# Patient Record
Sex: MALE | Race: WHITE | NOT HISPANIC OR LATINO | Employment: OTHER | RURAL
[De-identification: names, ages, dates, MRNs, and addresses within clinical notes are randomized per-mention and may not be internally consistent; named-entity substitution may affect disease eponyms.]

---

## 2020-03-26 ENCOUNTER — HISTORICAL (OUTPATIENT)
Dept: ADMINISTRATIVE | Facility: HOSPITAL | Age: 70
End: 2020-03-26

## 2020-03-26 LAB
ALBUMIN SERPL BCP-MCNC: 2.7 G/DL (ref 3.5–5)
ALBUMIN/GLOB SERPL: 0.5 {RATIO}
ALP SERPL-CCNC: 180 U/L (ref 45–115)
ALT SERPL W P-5'-P-CCNC: 101 U/L (ref 16–61)
AST SERPL W P-5'-P-CCNC: 81 U/L (ref 15–37)
BASOPHILS # BLD AUTO: 0.02 X10E3/UL (ref 0–0.2)
BASOPHILS NFR BLD AUTO: 0.3 % (ref 0–1)
BILIRUB SERPL-MCNC: 1 MG/DL (ref 0–1.2)
BUN SERPL-MCNC: 17 MG/DL (ref 7–18)
BUN/CREAT SERPL: 11.3
CALCIUM SERPL-MCNC: 9.1 MG/DL (ref 8.5–10.1)
CHLORIDE SERPL-SCNC: 99 MMOL/L (ref 98–107)
CO2 SERPL-SCNC: 28 MMOL/L (ref 21–32)
CREAT SERPL-MCNC: 1.51 MG/DL (ref 0.7–1.3)
EOSINOPHIL # BLD AUTO: 0.09 X10E3/UL (ref 0–0.5)
EOSINOPHIL NFR BLD AUTO: 1.3 % (ref 1–4)
ERYTHROCYTE [DISTWIDTH] IN BLOOD BY AUTOMATED COUNT: 12.9 % (ref 11.5–14.5)
GLOBULIN SER-MCNC: 5.1 G/DL (ref 2–4)
GLUCOSE SERPL-MCNC: 95 MG/DL (ref 74–106)
HCT VFR BLD AUTO: 45.9 % (ref 40–54)
HGB BLD-MCNC: 14.7 G/DL (ref 13.5–18)
IMM GRANULOCYTES # BLD AUTO: 0.12 X10E3/UL (ref 0–0.04)
IMM GRANULOCYTES NFR BLD: 1.7 % (ref 0–0.4)
LYMPHOCYTES # BLD AUTO: 0.8 X10E3/UL (ref 1–4.8)
LYMPHOCYTES NFR BLD AUTO: 11.5 % (ref 27–41)
MAGNESIUM SERPL-MCNC: 2.5 MG/DL (ref 1.7–2.3)
MCH RBC QN AUTO: 31.5 PG (ref 27–31)
MCHC RBC AUTO-ENTMCNC: 32 G/DL (ref 32–36)
MCV RBC AUTO: 98.3 FL (ref 80–96)
MONOCYTES # BLD AUTO: 0.45 X10E3/UL (ref 0–0.8)
MONOCYTES NFR BLD AUTO: 6.5 % (ref 2–6)
MPC BLD CALC-MCNC: 9.2 FL (ref 9.4–12.4)
NEUTROPHILS # BLD AUTO: 5.48 X10E3/UL (ref 1.8–7.7)
NEUTROPHILS NFR BLD AUTO: 78.7 % (ref 53–65)
NRBC # BLD AUTO: 0 X10E3/UL (ref 0–0)
NRBC, AUTO (.00): 0 /100 (ref 0–0)
PLATELET # BLD AUTO: 488 X10E3/UL (ref 150–400)
POTASSIUM SERPL-SCNC: 4.1 MMOL/L (ref 3.5–5.1)
PROT SERPL-MCNC: 7.8 G/DL (ref 6.4–8.2)
RBC # BLD AUTO: 4.67 X10E6/UL (ref 4.6–6.2)
SODIUM SERPL-SCNC: 135 MMOL/L (ref 136–145)
WBC # BLD AUTO: 6.96 X10E3/UL (ref 4.5–11)

## 2020-04-01 LAB
REPORT: NORMAL

## 2020-04-03 ENCOUNTER — HISTORICAL (OUTPATIENT)
Dept: ADMINISTRATIVE | Facility: HOSPITAL | Age: 70
End: 2020-04-03

## 2020-04-03 LAB
ALBUMIN SERPL BCP-MCNC: 2.5 G/DL (ref 3.5–5)
ALBUMIN/GLOB SERPL: 0.5 {RATIO}
ALP SERPL-CCNC: 157 U/L (ref 45–115)
ALT SERPL W P-5'-P-CCNC: 89 U/L (ref 16–61)
AST SERPL W P-5'-P-CCNC: 61 U/L (ref 15–37)
BASOPHILS # BLD AUTO: 0 X10E3/UL (ref 0–0.2)
BASOPHILS NFR BLD AUTO: 0 % (ref 0–1)
BILIRUB SERPL-MCNC: 0.6 MG/DL (ref 0–1.2)
BILIRUB UR QL STRIP: NEGATIVE MG/DL
BUN SERPL-MCNC: 11 MG/DL (ref 7–18)
BUN/CREAT SERPL: 7.8
CALCIUM SERPL-MCNC: 8.8 MG/DL (ref 8.5–10.1)
CHLORIDE SERPL-SCNC: 101 MMOL/L (ref 98–107)
CLARITY UR: CLEAR
CO2 SERPL-SCNC: 27 MMOL/L (ref 21–32)
COLOR UR: ABNORMAL
CREAT SERPL-MCNC: 1.41 MG/DL (ref 0.7–1.3)
EOSINOPHIL # BLD AUTO: 0.15 X10E3/UL (ref 0–0.5)
EOSINOPHIL NFR BLD AUTO: 2.1 % (ref 1–4)
ERYTHROCYTE [DISTWIDTH] IN BLOOD BY AUTOMATED COUNT: 13.1 % (ref 11.5–14.5)
GLOBULIN SER-MCNC: 5.1 G/DL (ref 2–4)
GLUCOSE SERPL-MCNC: 124 MG/DL (ref 74–106)
GLUCOSE UR STRIP-MCNC: NEGATIVE MG/DL
HCT VFR BLD AUTO: 41.5 % (ref 40–54)
HGB BLD-MCNC: 13 G/DL (ref 13.5–18)
IMM GRANULOCYTES # BLD AUTO: 0.13 X10E3/UL (ref 0–0.04)
IMM GRANULOCYTES NFR BLD: 1.8 % (ref 0–0.4)
KETONES UR STRIP-SCNC: NEGATIVE MG/DL
LACTATE SERPL-SCNC: 1.9 MMOL/L (ref 0.4–2)
LEUKOCYTE ESTERASE UR QL STRIP: NEGATIVE LEU/UL
LYMPHOCYTES # BLD AUTO: 1.03 X10E3/UL (ref 1–4.8)
LYMPHOCYTES NFR BLD AUTO: 14.2 % (ref 27–41)
MCH RBC QN AUTO: 31.2 PG (ref 27–31)
MCHC RBC AUTO-ENTMCNC: 31.3 G/DL (ref 32–36)
MCV RBC AUTO: 99.5 FL (ref 80–96)
MONOCYTES # BLD AUTO: 0.49 X10E3/UL (ref 0–0.8)
MONOCYTES NFR BLD AUTO: 6.7 % (ref 2–6)
MPC BLD CALC-MCNC: 9.2 FL (ref 9.4–12.4)
NEUTROPHILS # BLD AUTO: 5.47 X10E3/UL (ref 1.8–7.7)
NEUTROPHILS NFR BLD AUTO: 75.2 % (ref 53–65)
NITRITE UR QL STRIP: NEGATIVE
NRBC # BLD AUTO: 0 X10E3/UL (ref 0–0)
NRBC, AUTO (.00): 0 /100 (ref 0–0)
PH UR STRIP: 7 PH UNITS (ref 5–8)
PLATELET # BLD AUTO: 498 X10E3/UL (ref 150–400)
POTASSIUM SERPL-SCNC: 4.5 MMOL/L (ref 3.5–5.1)
PROT SERPL-MCNC: 7.6 G/DL (ref 6.4–8.2)
PROT UR QL STRIP: NEGATIVE MG/DL
RBC # BLD AUTO: 4.17 X10E6/UL (ref 4.6–6.2)
RBC # UR STRIP: NEGATIVE ERY/UL
SODIUM SERPL-SCNC: 137 MMOL/L (ref 136–145)
SP GR UR STRIP: <=1.005 (ref 1–1.03)
UROBILINOGEN UR STRIP-ACNC: 0.2 EU/DL
WBC # BLD AUTO: 7.27 X10E3/UL (ref 4.5–11)

## 2020-04-04 ENCOUNTER — HISTORICAL (OUTPATIENT)
Dept: ADMINISTRATIVE | Facility: HOSPITAL | Age: 70
End: 2020-04-04

## 2020-04-09 LAB
REPORT: NORMAL

## 2020-04-11 ENCOUNTER — HISTORICAL (OUTPATIENT)
Dept: ADMINISTRATIVE | Facility: HOSPITAL | Age: 70
End: 2020-04-11

## 2020-04-29 ENCOUNTER — HISTORICAL (OUTPATIENT)
Dept: ADMINISTRATIVE | Facility: HOSPITAL | Age: 70
End: 2020-04-29

## 2021-04-05 ENCOUNTER — HOSPITAL ENCOUNTER (OUTPATIENT)
Dept: RADIOLOGY | Facility: HOSPITAL | Age: 71
Discharge: HOME | End: 2021-04-05
Attending: INTERNAL MEDICINE
Payer: MEDICARE

## 2021-04-05 ENCOUNTER — OFFICE VISIT (OUTPATIENT)
Dept: CARDIOLOGY | Facility: CLINIC | Age: 71
End: 2021-04-05
Payer: MEDICARE

## 2021-04-05 VITALS
WEIGHT: 189 LBS | HEART RATE: 115 BPM | DIASTOLIC BLOOD PRESSURE: 62 MMHG | RESPIRATION RATE: 16 BRPM | BODY MASS INDEX: 26.46 KG/M2 | SYSTOLIC BLOOD PRESSURE: 100 MMHG | HEIGHT: 71 IN

## 2021-04-05 DIAGNOSIS — R06.02 SOB (SHORTNESS OF BREATH): ICD-10-CM

## 2021-04-05 DIAGNOSIS — G47.33 OBSTRUCTIVE SLEEP APNEA: ICD-10-CM

## 2021-04-05 DIAGNOSIS — E78.00 HYPERCHOLESTEREMIA: ICD-10-CM

## 2021-04-05 DIAGNOSIS — I73.9 CLAUDICATION: ICD-10-CM

## 2021-04-05 DIAGNOSIS — R07.9 CHEST PAIN, UNSPECIFIED TYPE: ICD-10-CM

## 2021-04-05 DIAGNOSIS — R07.9 CHEST PAIN, UNSPECIFIED TYPE: Primary | ICD-10-CM

## 2021-04-05 DIAGNOSIS — R06.02 SOB (SHORTNESS OF BREATH): Primary | ICD-10-CM

## 2021-04-05 DIAGNOSIS — R00.0 SINUS TACHYCARDIA: ICD-10-CM

## 2021-04-05 DIAGNOSIS — I10 ESSENTIAL HYPERTENSION, BENIGN: ICD-10-CM

## 2021-04-05 PROCEDURE — 99214 OFFICE O/P EST MOD 30 MIN: CPT | Mod: S$PBB,,, | Performed by: INTERNAL MEDICINE

## 2021-04-05 PROCEDURE — 71046 X-RAY EXAM CHEST 2 VIEWS: CPT | Mod: TC

## 2021-04-05 PROCEDURE — 93010 EKG 12-LEAD: ICD-10-PCS | Mod: S$PBB,,, | Performed by: INTERNAL MEDICINE

## 2021-04-05 PROCEDURE — 71046 XR CHEST PA AND LATERAL: ICD-10-PCS | Mod: 26,,, | Performed by: RADIOLOGY

## 2021-04-05 PROCEDURE — 71046 X-RAY EXAM CHEST 2 VIEWS: CPT | Mod: 26,,, | Performed by: RADIOLOGY

## 2021-04-05 PROCEDURE — 93005 ELECTROCARDIOGRAM TRACING: CPT | Mod: PBBFAC | Performed by: INTERNAL MEDICINE

## 2021-04-05 PROCEDURE — 99214 OFFICE O/P EST MOD 30 MIN: CPT | Mod: PBBFAC,25 | Performed by: INTERNAL MEDICINE

## 2021-04-05 PROCEDURE — 99214 PR OFFICE/OUTPT VISIT, EST, LEVL IV, 30-39 MIN: ICD-10-PCS | Mod: S$PBB,,, | Performed by: INTERNAL MEDICINE

## 2021-04-05 PROCEDURE — 99999 PR PBB SHADOW E&M-EST. PATIENT-LVL IV: ICD-10-PCS | Mod: PBBFAC,,, | Performed by: INTERNAL MEDICINE

## 2021-04-05 PROCEDURE — 93010 ELECTROCARDIOGRAM REPORT: CPT | Mod: S$PBB,,, | Performed by: INTERNAL MEDICINE

## 2021-04-05 PROCEDURE — 99999 PR PBB SHADOW E&M-EST. PATIENT-LVL IV: CPT | Mod: PBBFAC,,, | Performed by: INTERNAL MEDICINE

## 2021-04-05 RX ORDER — OMEPRAZOLE 20 MG/1
20 CAPSULE, DELAYED RELEASE ORAL 2 TIMES DAILY
COMMUNITY

## 2021-04-05 RX ORDER — AZELASTINE 1 MG/ML
1 SPRAY, METERED NASAL DAILY
COMMUNITY

## 2021-04-05 RX ORDER — TAMSULOSIN HYDROCHLORIDE 0.4 MG/1
0.4 CAPSULE ORAL 2 TIMES DAILY
COMMUNITY

## 2021-04-05 RX ORDER — FLUTICASONE PROPIONATE 50 MCG
1 SPRAY, SUSPENSION (ML) NASAL DAILY
COMMUNITY

## 2021-04-05 RX ORDER — PRAMIPEXOLE DIHYDROCHLORIDE 0.5 MG/1
0.5 TABLET ORAL NIGHTLY PRN
COMMUNITY
End: 2023-01-19

## 2021-04-05 RX ORDER — TRIAMCINOLONE ACETONIDE 0.25 MG/G
0.03 CREAM TOPICAL DAILY
COMMUNITY

## 2021-04-05 RX ORDER — LISINOPRIL AND HYDROCHLOROTHIAZIDE 10; 12.5 MG/1; MG/1
1 TABLET ORAL DAILY
COMMUNITY

## 2021-04-05 RX ORDER — LANOLIN ALCOHOL/MO/W.PET/CERES
1000 CREAM (GRAM) TOPICAL DAILY
COMMUNITY

## 2021-04-05 RX ORDER — IBUPROFEN 100 MG/5ML
1000 SUSPENSION, ORAL (FINAL DOSE FORM) ORAL DAILY
COMMUNITY

## 2021-04-05 RX ORDER — ROSUVASTATIN CALCIUM 20 MG/1
10 TABLET, COATED ORAL DAILY
COMMUNITY

## 2021-04-05 RX ORDER — MINERAL OIL
180 ENEMA (ML) RECTAL DAILY
COMMUNITY

## 2021-04-05 RX ORDER — GUAIFENESIN 1200 MG/1
1200 TABLET, EXTENDED RELEASE ORAL EVERY 4 HOURS PRN
COMMUNITY
End: 2023-01-19

## 2021-04-05 RX ORDER — ERGOCALCIFEROL 1.25 MG/1
50000 CAPSULE ORAL
COMMUNITY
End: 2023-01-19

## 2021-04-07 PROBLEM — I73.9 CLAUDICATION: Status: ACTIVE | Noted: 2021-04-07

## 2021-04-07 PROBLEM — R00.0 SINUS TACHYCARDIA: Status: ACTIVE | Noted: 2021-04-07

## 2021-04-07 PROBLEM — R06.02 SOB (SHORTNESS OF BREATH): Status: ACTIVE | Noted: 2021-04-07

## 2021-04-07 PROBLEM — R07.9 CHEST PAIN: Status: ACTIVE | Noted: 2021-04-07

## 2021-04-07 PROBLEM — E78.00 HYPERCHOLESTEREMIA: Status: ACTIVE | Noted: 2021-04-07

## 2021-04-07 PROBLEM — G47.33 OBSTRUCTIVE SLEEP APNEA: Status: ACTIVE | Noted: 2021-04-07

## 2021-04-15 ENCOUNTER — HOSPITAL ENCOUNTER (OUTPATIENT)
Dept: CARDIOLOGY | Facility: HOSPITAL | Age: 71
Discharge: HOME | End: 2021-04-15
Attending: INTERNAL MEDICINE
Payer: MEDICARE

## 2021-04-15 ENCOUNTER — CLINICAL SUPPORT (OUTPATIENT)
Dept: PULMONOLOGY | Facility: HOSPITAL | Age: 71
End: 2021-04-15
Attending: INTERNAL MEDICINE
Payer: MEDICARE

## 2021-04-15 VITALS — OXYGEN SATURATION: 97 %

## 2021-04-15 VITALS
HEIGHT: 71 IN | WEIGHT: 175 LBS | DIASTOLIC BLOOD PRESSURE: 70 MMHG | SYSTOLIC BLOOD PRESSURE: 102 MMHG | HEART RATE: 101 BPM | BODY MASS INDEX: 24.5 KG/M2

## 2021-04-15 DIAGNOSIS — R00.0 SINUS TACHYCARDIA: ICD-10-CM

## 2021-04-15 DIAGNOSIS — R06.02 SOB (SHORTNESS OF BREATH): ICD-10-CM

## 2021-04-15 DIAGNOSIS — R07.9 CHEST PAIN, UNSPECIFIED TYPE: ICD-10-CM

## 2021-04-15 PROCEDURE — 94060 EVALUATION OF WHEEZING: CPT

## 2021-04-15 PROCEDURE — 94060 PR EVAL OF BRONCHOSPASM: ICD-10-PCS | Mod: 26,,, | Performed by: INTERNAL MEDICINE

## 2021-04-15 PROCEDURE — 93306 ECHO (CUPID ONLY): ICD-10-PCS | Mod: 26,,, | Performed by: INTERNAL MEDICINE

## 2021-04-15 PROCEDURE — 93018 CV STRESS TEST I&R ONLY: CPT | Mod: ,,, | Performed by: INTERNAL MEDICINE

## 2021-04-15 PROCEDURE — 94727 GAS DIL/WSHOT DETER LNG VOL: CPT | Mod: 26,,, | Performed by: INTERNAL MEDICINE

## 2021-04-15 PROCEDURE — 93226 XTRNL ECG REC<48 HR SCAN A/R: CPT

## 2021-04-15 PROCEDURE — 94726 PLETHYSMOGRAPHY LUNG VOLUMES: CPT

## 2021-04-15 PROCEDURE — 93018 EXERCISE STRESS - EKG (CUPID ONLY): ICD-10-PCS | Mod: ,,, | Performed by: INTERNAL MEDICINE

## 2021-04-15 PROCEDURE — 94060 EVALUATION OF WHEEZING: CPT | Mod: 26,,, | Performed by: INTERNAL MEDICINE

## 2021-04-15 PROCEDURE — 94729 DIFFUSING CAPACITY: CPT

## 2021-04-15 PROCEDURE — 93306 TTE W/DOPPLER COMPLETE: CPT | Mod: 26,,, | Performed by: INTERNAL MEDICINE

## 2021-04-15 PROCEDURE — 94729 DIFFUSING CAPACITY: CPT | Mod: 26,,, | Performed by: INTERNAL MEDICINE

## 2021-04-15 PROCEDURE — 93016 CV STRESS TEST SUPVJ ONLY: CPT | Mod: ,,, | Performed by: NURSE PRACTITIONER

## 2021-04-15 PROCEDURE — 93017 CV STRESS TEST TRACING ONLY: CPT

## 2021-04-15 PROCEDURE — 93306 TTE W/DOPPLER COMPLETE: CPT

## 2021-04-15 PROCEDURE — 94727 PR PULM FUNCTION TEST BY GAS: ICD-10-PCS | Mod: 26,,, | Performed by: INTERNAL MEDICINE

## 2021-04-15 PROCEDURE — 93016 EXERCISE STRESS - EKG (CUPID ONLY): ICD-10-PCS | Mod: ,,, | Performed by: NURSE PRACTITIONER

## 2021-04-15 PROCEDURE — 94729 PR C02/MEMBANE DIFFUSE CAPACITY: ICD-10-PCS | Mod: 26,,, | Performed by: INTERNAL MEDICINE

## 2021-04-19 LAB
AORTIC VALVE CUSP SEPERATION: 1.98 CM
AV MEAN GRADIENT: 3 MMHG
BSA FOR ECHO PROCEDURE: 1.99 M2
CV ECHO LV RWT: 0.38 CM
DOP CALC AO VTI: 16.26 CM
DOP CALC LVOT AREA: 4.2 CM2
DOP CALC LVOT DIAMETER: 2.3 CM
E WAVE DECELERATION TIME: 188 MSEC
ECHO LV POSTERIOR WALL: 0.72 CM (ref 0.6–1.1)
EJECTION FRACTION: 60 %
FRACTIONAL SHORTENING: 45 % (ref 28–44)
INTERVENTRICULAR SEPTUM: 1.15 CM (ref 0.6–1.1)
LEFT ATRIUM SIZE: 3.4 CM
LEFT INTERNAL DIMENSION IN SYSTOLE: 2.09 CM (ref 2.1–4)
LEFT VENTRICLE DIASTOLIC VOLUME INDEX: 33.57 ML/M2
LEFT VENTRICLE DIASTOLIC VOLUME: 66.8 ML
LEFT VENTRICLE MASS INDEX: 53 G/M2
LEFT VENTRICLE SYSTOLIC VOLUME INDEX: 9.4 ML/M2
LEFT VENTRICLE SYSTOLIC VOLUME: 18.7 ML
LEFT VENTRICULAR INTERNAL DIMENSION IN DIASTOLE: 3.77 CM (ref 3.5–6)
LEFT VENTRICULAR MASS: 105.29 G
MV PEAK E VEL: 0.63 M/S
PISA TR MAX VEL: 2.47 M/S
RIGHT VENTRICULAR END-DIASTOLIC DIMENSION: 2 CM
TR MAX PG: 24 MMHG
TRICUSPID ANNULAR PLANE SYSTOLIC EXCURSION: 3 CM

## 2021-04-21 ENCOUNTER — OFFICE VISIT (OUTPATIENT)
Dept: CARDIOLOGY | Facility: CLINIC | Age: 71
End: 2021-04-21
Payer: MEDICARE

## 2021-04-21 VITALS
DIASTOLIC BLOOD PRESSURE: 60 MMHG | WEIGHT: 184.5 LBS | RESPIRATION RATE: 16 BRPM | HEIGHT: 71 IN | HEART RATE: 88 BPM | SYSTOLIC BLOOD PRESSURE: 98 MMHG | BODY MASS INDEX: 25.83 KG/M2

## 2021-04-21 DIAGNOSIS — E78.00 HYPERCHOLESTEREMIA: ICD-10-CM

## 2021-04-21 DIAGNOSIS — I10 ESSENTIAL HYPERTENSION, BENIGN: Primary | ICD-10-CM

## 2021-04-21 DIAGNOSIS — R06.09 DOE (DYSPNEA ON EXERTION): ICD-10-CM

## 2021-04-21 DIAGNOSIS — R06.02 SOB (SHORTNESS OF BREATH): ICD-10-CM

## 2021-04-21 LAB
CV STRESS BASE HR: 101 BPM
DIASTOLIC BLOOD PRESSURE: 70 MMHG
OHS CV CPX 1 MINUTE RECOVERY HEART RATE: 115 BPM
OHS CV CPX 85 PERCENT MAX PREDICTED HEART RATE MALE: 126
OHS CV CPX ESTIMATED METS: 6
OHS CV CPX MAX PREDICTED HEART RATE: 148
OHS CV CPX PATIENT IS FEMALE: 0
OHS CV CPX PATIENT IS MALE: 1
OHS CV CPX PEAK DIASTOLIC BLOOD PRESSURE: 63 MMHG
OHS CV CPX PEAK HEAR RATE: 142 BPM
OHS CV CPX PEAK RATE PRESSURE PRODUCT: NORMAL
OHS CV CPX PEAK SYSTOLIC BLOOD PRESSURE: 146 MMHG
OHS CV CPX PERCENT MAX PREDICTED HEART RATE ACHIEVED: 96
OHS CV CPX RATE PRESSURE PRODUCT PRESENTING: NORMAL
STRESS ECHO POST EXERCISE DUR MIN: 4 MINUTES
STRESS ECHO POST EXERCISE DUR SEC: 15 SECONDS
SYSTOLIC BLOOD PRESSURE: 102 MMHG

## 2021-04-21 PROCEDURE — 99214 OFFICE O/P EST MOD 30 MIN: CPT | Mod: S$PBB,,, | Performed by: INTERNAL MEDICINE

## 2021-04-21 PROCEDURE — 99214 PR OFFICE/OUTPT VISIT, EST, LEVL IV, 30-39 MIN: ICD-10-PCS | Mod: S$PBB,,, | Performed by: INTERNAL MEDICINE

## 2021-04-21 PROCEDURE — 99999 PR PBB SHADOW E&M-EST. PATIENT-LVL IV: ICD-10-PCS | Mod: PBBFAC,,, | Performed by: INTERNAL MEDICINE

## 2021-04-21 PROCEDURE — 99999 PR PBB SHADOW E&M-EST. PATIENT-LVL IV: CPT | Mod: PBBFAC,,, | Performed by: INTERNAL MEDICINE

## 2021-04-21 PROCEDURE — 99214 OFFICE O/P EST MOD 30 MIN: CPT | Mod: PBBFAC | Performed by: INTERNAL MEDICINE

## 2021-04-26 DIAGNOSIS — Z01.812 PRE-OPERATIVE LABORATORY EXAMINATION: Primary | ICD-10-CM

## 2021-04-26 DIAGNOSIS — R06.09 DOE (DYSPNEA ON EXERTION): Primary | ICD-10-CM

## 2021-04-26 RX ORDER — DIPHENHYDRAMINE HCL 25 MG
50 CAPSULE ORAL
Status: CANCELLED | OUTPATIENT
Start: 2021-05-03

## 2021-04-26 RX ORDER — SODIUM CHLORIDE 450 MG/100ML
INJECTION, SOLUTION INTRAVENOUS CONTINUOUS
Status: CANCELLED | OUTPATIENT
Start: 2021-05-03

## 2021-04-26 RX ORDER — DIAZEPAM 2 MG/1
5 TABLET ORAL ONCE
Status: CANCELLED | OUTPATIENT
Start: 2021-05-03

## 2021-05-03 ENCOUNTER — HOSPITAL ENCOUNTER (OUTPATIENT)
Facility: HOSPITAL | Age: 71
Discharge: HOME | End: 2021-05-04
Attending: INTERNAL MEDICINE | Admitting: INTERNAL MEDICINE
Payer: MEDICARE

## 2021-05-03 DIAGNOSIS — R06.09 DOE (DYSPNEA ON EXERTION): ICD-10-CM

## 2021-05-03 DIAGNOSIS — I25.10 ATHEROSCLEROSIS OF CORONARY ARTERY: ICD-10-CM

## 2021-05-03 LAB
CATH EF QUANTITATIVE: 60 %
CHOLEST SERPL-MCNC: 127 MG/DL (ref 0–200)
CHOLEST/HDLC SERPL: 4 {RATIO}
GLUCOSE SERPL-MCNC: 131 MG/DL (ref 70–105)
HDLC SERPL-MCNC: 32 MG/DL (ref 40–60)
LDLC SERPL CALC-MCNC: 71 MG/DL
LDLC/HDLC SERPL: 2.2 {RATIO}
NONHDLC SERPL-MCNC: 95 MG/DL
TRIGL SERPL-MCNC: 122 MG/DL (ref 35–150)
VLDLC SERPL-MCNC: 24 MG/DL

## 2021-05-03 PROCEDURE — 92928 PR STENT: ICD-10-PCS | Mod: LD,,, | Performed by: INTERNAL MEDICINE

## 2021-05-03 PROCEDURE — 93458 L HRT ARTERY/VENTRICLE ANGIO: CPT | Mod: 59 | Performed by: INTERNAL MEDICINE

## 2021-05-03 PROCEDURE — C1769 GUIDE WIRE: HCPCS | Performed by: INTERNAL MEDICINE

## 2021-05-03 PROCEDURE — 99153 MOD SED SAME PHYS/QHP EA: CPT | Performed by: INTERNAL MEDICINE

## 2021-05-03 PROCEDURE — 36415 COLL VENOUS BLD VENIPUNCTURE: CPT | Performed by: INTERNAL MEDICINE

## 2021-05-03 PROCEDURE — 63600175 PHARM REV CODE 636 W HCPCS: Performed by: INTERNAL MEDICINE

## 2021-05-03 PROCEDURE — 25000003 PHARM REV CODE 250: Performed by: INTERNAL MEDICINE

## 2021-05-03 PROCEDURE — 93571 IV DOP VEL&/PRESS C FLO 1ST: CPT | Mod: 26,52,, | Performed by: INTERNAL MEDICINE

## 2021-05-03 PROCEDURE — 93458 L HRT ARTERY/VENTRICLE ANGIO: CPT | Mod: 26,XU,, | Performed by: INTERNAL MEDICINE

## 2021-05-03 PROCEDURE — 80061 LIPID PANEL: CPT | Performed by: INTERNAL MEDICINE

## 2021-05-03 PROCEDURE — C1894 INTRO/SHEATH, NON-LASER: HCPCS | Performed by: INTERNAL MEDICINE

## 2021-05-03 PROCEDURE — 94761 N-INVAS EAR/PLS OXIMETRY MLT: CPT | Mod: 59

## 2021-05-03 PROCEDURE — 27800903 OPTIME MED/SURG SUP & DEVICES OTHER IMPLANTS: Performed by: INTERNAL MEDICINE

## 2021-05-03 PROCEDURE — 27100168 OPTIME MED/SURG SUP & DEVICES NON-STERILE SUPPLY: Performed by: INTERNAL MEDICINE

## 2021-05-03 PROCEDURE — 93571 IV DOP VEL&/PRESS C FLO 1ST: CPT | Mod: 52 | Performed by: INTERNAL MEDICINE

## 2021-05-03 PROCEDURE — C9600 PERC DRUG-EL COR STENT SING: HCPCS | Mod: LD | Performed by: INTERNAL MEDICINE

## 2021-05-03 PROCEDURE — C1887 CATHETER, GUIDING: HCPCS | Performed by: INTERNAL MEDICINE

## 2021-05-03 PROCEDURE — 27201423 OPTIME MED/SURG SUP & DEVICES STERILE SUPPLY: Performed by: INTERNAL MEDICINE

## 2021-05-03 PROCEDURE — 92928 PRQ TCAT PLMT NTRAC ST 1 LES: CPT | Mod: LD,,, | Performed by: INTERNAL MEDICINE

## 2021-05-03 PROCEDURE — C1725 CATH, TRANSLUMIN NON-LASER: HCPCS | Performed by: INTERNAL MEDICINE

## 2021-05-03 PROCEDURE — 99152 MOD SED SAME PHYS/QHP 5/>YRS: CPT | Performed by: INTERNAL MEDICINE

## 2021-05-03 PROCEDURE — C1760 CLOSURE DEV, VASC: HCPCS | Performed by: INTERNAL MEDICINE

## 2021-05-03 PROCEDURE — 93458 PR CATH PLACE/CORON ANGIO, IMG SUPER/INTERP,W LEFT HEART VENTRICULOGRAPHY: ICD-10-PCS | Mod: 26,XU,, | Performed by: INTERNAL MEDICINE

## 2021-05-03 PROCEDURE — 27000654 HC CATH IV JELCO

## 2021-05-03 PROCEDURE — 25000003 PHARM REV CODE 250

## 2021-05-03 PROCEDURE — 25500020 PHARM REV CODE 255: Performed by: INTERNAL MEDICINE

## 2021-05-03 PROCEDURE — 82962 GLUCOSE BLOOD TEST: CPT

## 2021-05-03 PROCEDURE — 93571 PR HEART FLOW RESERV MEASURE,INIT VESSL: ICD-10-PCS | Mod: 26,52,, | Performed by: INTERNAL MEDICINE

## 2021-05-03 PROCEDURE — C1874 STENT, COATED/COV W/DEL SYS: HCPCS | Performed by: INTERNAL MEDICINE

## 2021-05-03 PROCEDURE — 27000080 OPTIME MED/SURG SUP & DEVICES GENERAL CLASSIFICATION: Performed by: INTERNAL MEDICINE

## 2021-05-03 DEVICE — XIENCE SIERRA™ EVEROLIMUS ELUTING CORONARY STENT SYSTEM 3.50 MM X 12 MM / RAPID-EXCHANGE
Type: IMPLANTABLE DEVICE | Site: CORONARY | Status: FUNCTIONAL
Brand: XIENCE SIERRA™

## 2021-05-03 DEVICE — XIENCE SIERRA™ EVEROLIMUS ELUTING CORONARY STENT SYSTEM 3.25 MM X 23 MM / RAPID-EXCHANGE
Type: IMPLANTABLE DEVICE | Site: CORONARY | Status: FUNCTIONAL
Brand: XIENCE SIERRA™

## 2021-05-03 DEVICE — XIENCE SIERRA™ EVEROLIMUS ELUTING CORONARY STENT SYSTEM 3.00 MM X 38 MM / RAPID-EXCHANGE
Type: IMPLANTABLE DEVICE | Site: CORONARY | Status: FUNCTIONAL
Brand: XIENCE SIERRA™

## 2021-05-03 RX ORDER — DIAZEPAM 5 MG/1
TABLET ORAL
Status: DISCONTINUED | OUTPATIENT
Start: 2021-05-03 | End: 2021-05-03 | Stop reason: HOSPADM

## 2021-05-03 RX ORDER — DIAZEPAM 5 MG/1
5 TABLET ORAL ONCE
Status: COMPLETED | OUTPATIENT
Start: 2021-05-03 | End: 2021-05-03

## 2021-05-03 RX ORDER — FENTANYL CITRATE 50 UG/ML
INJECTION, SOLUTION INTRAMUSCULAR; INTRAVENOUS
Status: DISCONTINUED | OUTPATIENT
Start: 2021-05-03 | End: 2021-05-03 | Stop reason: HOSPADM

## 2021-05-03 RX ORDER — ACETAMINOPHEN 325 MG/1
650 TABLET ORAL EVERY 4 HOURS PRN
Status: DISCONTINUED | OUTPATIENT
Start: 2021-05-03 | End: 2021-05-04 | Stop reason: HOSPADM

## 2021-05-03 RX ORDER — ASPIRIN 81 MG/1
81 TABLET ORAL DAILY
Status: DISCONTINUED | OUTPATIENT
Start: 2021-05-03 | End: 2021-05-04 | Stop reason: HOSPADM

## 2021-05-03 RX ORDER — LIDOCAINE HYDROCHLORIDE 10 MG/ML
INJECTION, SOLUTION EPIDURAL; INFILTRATION; INTRACAUDAL; PERINEURAL
Status: DISCONTINUED | OUTPATIENT
Start: 2021-05-03 | End: 2021-05-03 | Stop reason: HOSPADM

## 2021-05-03 RX ORDER — ONDANSETRON 4 MG/1
8 TABLET, ORALLY DISINTEGRATING ORAL EVERY 8 HOURS PRN
Status: DISCONTINUED | OUTPATIENT
Start: 2021-05-03 | End: 2021-05-04 | Stop reason: HOSPADM

## 2021-05-03 RX ORDER — HEPARIN SOD,PORCINE/0.9 % NACL 1000/500ML
INTRAVENOUS SOLUTION INTRAVENOUS
Status: DISCONTINUED | OUTPATIENT
Start: 2021-05-03 | End: 2021-05-03 | Stop reason: HOSPADM

## 2021-05-03 RX ORDER — SODIUM CHLORIDE 450 MG/100ML
INJECTION, SOLUTION INTRAVENOUS
Status: DISCONTINUED | OUTPATIENT
Start: 2021-05-03 | End: 2021-05-04 | Stop reason: HOSPADM

## 2021-05-03 RX ORDER — DIPHENHYDRAMINE HCL 25 MG
50 CAPSULE ORAL
Status: DISCONTINUED | OUTPATIENT
Start: 2021-05-03 | End: 2021-05-03 | Stop reason: HOSPADM

## 2021-05-03 RX ORDER — SODIUM CHLORIDE 450 MG/100ML
INJECTION, SOLUTION INTRAVENOUS
Status: COMPLETED
Start: 2021-05-03 | End: 2021-05-03

## 2021-05-03 RX ORDER — NITROGLYCERIN 5 MG/ML
INJECTION, SOLUTION INTRAVENOUS
Status: DISCONTINUED | OUTPATIENT
Start: 2021-05-03 | End: 2021-05-03 | Stop reason: HOSPADM

## 2021-05-03 RX ORDER — HEPARIN SODIUM 1000 [USP'U]/ML
INJECTION, SOLUTION INTRAVENOUS; SUBCUTANEOUS
Status: DISCONTINUED | OUTPATIENT
Start: 2021-05-03 | End: 2021-05-03 | Stop reason: HOSPADM

## 2021-05-03 RX ORDER — MIDAZOLAM HYDROCHLORIDE 1 MG/ML
INJECTION INTRAMUSCULAR; INTRAVENOUS
Status: DISCONTINUED | OUTPATIENT
Start: 2021-05-03 | End: 2021-05-03 | Stop reason: HOSPADM

## 2021-05-03 RX ORDER — SODIUM CHLORIDE 450 MG/100ML
INJECTION, SOLUTION INTRAVENOUS CONTINUOUS
Status: DISCONTINUED | OUTPATIENT
Start: 2021-05-03 | End: 2021-05-04 | Stop reason: HOSPADM

## 2021-05-03 RX ADMIN — BIVALIRUDIN 1.75 MG/KG/HR: 250 INJECTION, POWDER, LYOPHILIZED, FOR SOLUTION INTRAVENOUS at 12:05

## 2021-05-03 RX ADMIN — ASPIRIN 81 MG: 81 TABLET, COATED ORAL at 02:05

## 2021-05-03 RX ADMIN — SODIUM CHLORIDE: 4.5 INJECTION, SOLUTION INTRAVENOUS at 08:05

## 2021-05-03 RX ADMIN — DIPHENHYDRAMINE HYDROCHLORIDE 50 MG: 25 CAPSULE ORAL at 11:05

## 2021-05-03 RX ADMIN — TICAGRELOR 90 MG: 90 TABLET ORAL at 09:05

## 2021-05-03 RX ADMIN — DIAZEPAM 5 MG: 5 TABLET ORAL at 11:05

## 2021-05-04 ENCOUNTER — HOSPITAL ENCOUNTER (OUTPATIENT)
Dept: RADIOLOGY | Facility: HOSPITAL | Age: 71
Discharge: HOME | End: 2021-05-04
Attending: INTERNAL MEDICINE
Payer: MEDICARE

## 2021-05-04 VITALS
DIASTOLIC BLOOD PRESSURE: 74 MMHG | TEMPERATURE: 98 F | RESPIRATION RATE: 18 BRPM | HEART RATE: 61 BPM | WEIGHT: 180 LBS | BODY MASS INDEX: 25.2 KG/M2 | OXYGEN SATURATION: 98 % | SYSTOLIC BLOOD PRESSURE: 128 MMHG | HEIGHT: 71 IN

## 2021-05-04 DIAGNOSIS — R19.09 GROIN SWELLING: Primary | ICD-10-CM

## 2021-05-04 DIAGNOSIS — R19.09 GROIN SWELLING: ICD-10-CM

## 2021-05-04 DIAGNOSIS — M79.81 NONTRAUMATIC HEMATOMA OF SOFT TISSUE: ICD-10-CM

## 2021-05-04 LAB
BASOPHILS # BLD AUTO: 0.01 K/UL (ref 0–0.2)
BASOPHILS NFR BLD AUTO: 0.2 % (ref 0–1)
CK MB SERPL-MCNC: <1 NG/ML (ref 1–3.6)
DIFFERENTIAL METHOD BLD: ABNORMAL
EOSINOPHIL # BLD AUTO: 0.11 K/UL (ref 0–0.5)
EOSINOPHIL NFR BLD AUTO: 2.5 % (ref 1–4)
ERYTHROCYTE [DISTWIDTH] IN BLOOD BY AUTOMATED COUNT: 13.7 % (ref 11.5–14.5)
GLUCOSE SERPL-MCNC: 103 MG/DL (ref 70–105)
HCT VFR BLD AUTO: 40 % (ref 40–54)
HGB BLD-MCNC: 13.1 G/DL (ref 13.5–18)
IMM GRANULOCYTES # BLD AUTO: 0.05 K/UL (ref 0–0.04)
IMM GRANULOCYTES NFR BLD: 1.1 % (ref 0–0.4)
LYMPHOCYTES # BLD AUTO: 0.77 K/UL (ref 1–4.8)
LYMPHOCYTES NFR BLD AUTO: 17.7 % (ref 27–41)
MCH RBC QN AUTO: 32.1 PG (ref 27–31)
MCHC RBC AUTO-ENTMCNC: 32.8 G/DL (ref 32–36)
MCV RBC AUTO: 98 FL (ref 80–96)
MONOCYTES # BLD AUTO: 0.31 K/UL (ref 0–0.8)
MONOCYTES NFR BLD AUTO: 7.1 % (ref 2–6)
MPC BLD CALC-MCNC: 10.8 FL (ref 9.4–12.4)
NEUTROPHILS # BLD AUTO: 3.11 K/UL (ref 1.8–7.7)
NEUTROPHILS NFR BLD AUTO: 71.4 % (ref 53–65)
NRBC # BLD AUTO: 0 X10E3/UL
NRBC, AUTO (.00): 0 %
PLATELET # BLD AUTO: 178 K/UL (ref 150–400)
RBC # BLD AUTO: 4.08 M/UL (ref 4.6–6.2)
WBC # BLD AUTO: 4.36 K/UL (ref 4.5–11)

## 2021-05-04 PROCEDURE — 82553 CREATINE MB FRACTION: CPT | Performed by: INTERNAL MEDICINE

## 2021-05-04 PROCEDURE — 93010 EKG 12-LEAD: ICD-10-PCS | Mod: ,,, | Performed by: INTERNAL MEDICINE

## 2021-05-04 PROCEDURE — 93010 ELECTROCARDIOGRAM REPORT: CPT | Mod: ,,, | Performed by: INTERNAL MEDICINE

## 2021-05-04 PROCEDURE — 85025 COMPLETE CBC W/AUTO DIFF WBC: CPT | Performed by: INTERNAL MEDICINE

## 2021-05-04 PROCEDURE — 25000003 PHARM REV CODE 250: Performed by: INTERNAL MEDICINE

## 2021-05-04 PROCEDURE — 36415 COLL VENOUS BLD VENIPUNCTURE: CPT | Performed by: INTERNAL MEDICINE

## 2021-05-04 PROCEDURE — 93926 US PSEUDOANEURYSM EVALUATION RIGHT: ICD-10-PCS | Mod: 26,RT,, | Performed by: RADIOLOGY

## 2021-05-04 PROCEDURE — 93926 LOWER EXTREMITY STUDY: CPT | Mod: TC,RT

## 2021-05-04 PROCEDURE — 99212 OFFICE O/P EST SF 10 MIN: CPT | Mod: ,,, | Performed by: STUDENT IN AN ORGANIZED HEALTH CARE EDUCATION/TRAINING PROGRAM

## 2021-05-04 PROCEDURE — 82962 GLUCOSE BLOOD TEST: CPT

## 2021-05-04 PROCEDURE — 93005 ELECTROCARDIOGRAM TRACING: CPT

## 2021-05-04 PROCEDURE — 93926 LOWER EXTREMITY STUDY: CPT | Mod: 26,RT,, | Performed by: RADIOLOGY

## 2021-05-04 PROCEDURE — 99212 PR OFFICE/OUTPT VISIT, EST, LEVL II, 10-19 MIN: ICD-10-PCS | Mod: ,,, | Performed by: STUDENT IN AN ORGANIZED HEALTH CARE EDUCATION/TRAINING PROGRAM

## 2021-05-04 RX ORDER — ASPIRIN 81 MG/1
81 TABLET ORAL DAILY
Qty: 90 TABLET | Refills: 3 | Status: SHIPPED | OUTPATIENT
Start: 2021-05-05 | End: 2023-07-19

## 2021-05-04 RX ADMIN — SODIUM CHLORIDE: 4.5 INJECTION, SOLUTION INTRAVENOUS at 01:05

## 2021-05-04 RX ADMIN — TICAGRELOR 90 MG: 90 TABLET ORAL at 08:05

## 2021-05-04 RX ADMIN — ASPIRIN 81 MG: 81 TABLET, COATED ORAL at 08:05

## 2021-05-13 ENCOUNTER — NURSE TRIAGE (OUTPATIENT)
Dept: ADMINISTRATIVE | Facility: CLINIC | Age: 71
End: 2021-05-13

## 2021-05-17 LAB
OHS CV EVENT MONITOR DAY: 0
OHS CV HOLTER LENGTH DECIMAL HOURS: 24
OHS CV HOLTER LENGTH HOURS: 24
OHS CV HOLTER LENGTH MINUTES: 0

## 2021-05-17 PROCEDURE — 93227 XTRNL ECG REC<48 HR R&I: CPT | Mod: ,,, | Performed by: INTERNAL MEDICINE

## 2021-05-17 PROCEDURE — 93227 HOLTER MONITOR - 24 HOUR (CUPID ONLY): ICD-10-PCS | Mod: ,,, | Performed by: INTERNAL MEDICINE

## 2021-06-23 ENCOUNTER — OFFICE VISIT (OUTPATIENT)
Dept: CARDIOLOGY | Facility: CLINIC | Age: 71
End: 2021-06-23
Payer: MEDICARE

## 2021-06-23 VITALS
HEART RATE: 72 BPM | BODY MASS INDEX: 25.06 KG/M2 | RESPIRATION RATE: 14 BRPM | DIASTOLIC BLOOD PRESSURE: 80 MMHG | HEIGHT: 71 IN | WEIGHT: 179 LBS | SYSTOLIC BLOOD PRESSURE: 132 MMHG

## 2021-06-23 DIAGNOSIS — E78.00 HYPERCHOLESTEREMIA: ICD-10-CM

## 2021-06-23 DIAGNOSIS — I25.10 CORONARY ARTERY DISEASE INVOLVING NATIVE HEART WITHOUT ANGINA PECTORIS, UNSPECIFIED VESSEL OR LESION TYPE: Primary | ICD-10-CM

## 2021-06-23 DIAGNOSIS — I10 ESSENTIAL HYPERTENSION, BENIGN: ICD-10-CM

## 2021-06-23 DIAGNOSIS — G47.33 OBSTRUCTIVE SLEEP APNEA: ICD-10-CM

## 2021-06-23 PROCEDURE — 99214 PR OFFICE/OUTPT VISIT, EST, LEVL IV, 30-39 MIN: ICD-10-PCS | Mod: S$PBB,,, | Performed by: INTERNAL MEDICINE

## 2021-06-23 PROCEDURE — 99215 OFFICE O/P EST HI 40 MIN: CPT | Mod: PBBFAC | Performed by: INTERNAL MEDICINE

## 2021-06-23 PROCEDURE — 99214 OFFICE O/P EST MOD 30 MIN: CPT | Mod: S$PBB,,, | Performed by: INTERNAL MEDICINE

## 2021-08-11 RX ORDER — CLOPIDOGREL BISULFATE 75 MG/1
75 TABLET ORAL DAILY
Qty: 94 TABLET | Refills: 0 | Status: SHIPPED | OUTPATIENT
Start: 2021-08-11 | End: 2021-10-12 | Stop reason: SDUPTHER

## 2021-10-13 RX ORDER — CLOPIDOGREL BISULFATE 75 MG/1
75 TABLET ORAL DAILY
Qty: 90 TABLET | Refills: 1 | Status: SHIPPED | OUTPATIENT
Start: 2021-10-13 | End: 2022-04-15

## 2022-01-05 ENCOUNTER — OFFICE VISIT (OUTPATIENT)
Dept: CARDIOLOGY | Facility: CLINIC | Age: 72
End: 2022-01-05
Payer: OTHER GOVERNMENT

## 2022-01-05 VITALS
WEIGHT: 186 LBS | DIASTOLIC BLOOD PRESSURE: 84 MMHG | SYSTOLIC BLOOD PRESSURE: 124 MMHG | RESPIRATION RATE: 18 BRPM | BODY MASS INDEX: 26.04 KG/M2 | HEART RATE: 60 BPM | HEIGHT: 71 IN

## 2022-01-05 DIAGNOSIS — G47.33 OSA (OBSTRUCTIVE SLEEP APNEA): Chronic | ICD-10-CM

## 2022-01-05 DIAGNOSIS — I10 ESSENTIAL HYPERTENSION, BENIGN: Chronic | ICD-10-CM

## 2022-01-05 DIAGNOSIS — E78.00 HYPERCHOLESTEREMIA: Chronic | ICD-10-CM

## 2022-01-05 DIAGNOSIS — I25.10 CORONARY ARTERY DISEASE INVOLVING NATIVE HEART WITHOUT ANGINA PECTORIS, UNSPECIFIED VESSEL OR LESION TYPE: Primary | Chronic | ICD-10-CM

## 2022-01-05 PROCEDURE — 99214 PR OFFICE/OUTPT VISIT, EST, LEVL IV, 30-39 MIN: ICD-10-PCS | Mod: S$PBB,,, | Performed by: INTERNAL MEDICINE

## 2022-01-05 PROCEDURE — 93010 EKG 12-LEAD: ICD-10-PCS | Mod: S$PBB,,, | Performed by: INTERNAL MEDICINE

## 2022-01-05 PROCEDURE — 93010 ELECTROCARDIOGRAM REPORT: CPT | Mod: S$PBB,,, | Performed by: INTERNAL MEDICINE

## 2022-01-05 PROCEDURE — 99214 OFFICE O/P EST MOD 30 MIN: CPT | Mod: PBBFAC | Performed by: INTERNAL MEDICINE

## 2022-01-05 PROCEDURE — 93005 ELECTROCARDIOGRAM TRACING: CPT | Mod: PBBFAC | Performed by: INTERNAL MEDICINE

## 2022-01-05 PROCEDURE — 99214 OFFICE O/P EST MOD 30 MIN: CPT | Mod: S$PBB,,, | Performed by: INTERNAL MEDICINE

## 2022-01-07 NOTE — PROGRESS NOTES
[facility] note        DATE OF SERVICE: 01/07/2022       PCP: Henry Acosta MD      CHIEF COMPLAINT:   Chief Complaint   Patient presents with    Shortness of Breath     With exertion.  Denies any other cardiac complaints.     Follow-up     6 month.         HISTORY OF PRESENT ILLNESS:  Agustin Hahn is a 71 y.o. male with a PMH of   Past Medical History:   Diagnosis Date    Coronary artery disease     Hyperlipidemia     Hypertension     ASHLY (obstructive sleep apnea)      who presents for   Chief Complaint   Patient presents with    Shortness of Breath     With exertion.  Denies any other cardiac complaints.     Follow-up     6 month.           Review of Systems: Review of Systems   Respiratory: Positive for shortness of breath.    Cardiovascular: Negative for chest pain, palpitations and leg swelling.   Neurological: Negative for dizziness and loss of consciousness.        PAST MEDICAL HISTORY:  Past Medical History:   Diagnosis Date    Coronary artery disease     Hyperlipidemia     Hypertension     ASHLY (obstructive sleep apnea)        PAST SURGICAL HISTORY:  Past Surgical History:   Procedure Laterality Date    ANGIOGRAM, CORONARY, WITH LEFT HEART CATHETERIZATION Right 5/3/2021    Procedure: Angiogram, Coronary, with Left Heart Cath;  Surgeon: Henry Acosta MD;  Location: [facility];  Service: Cardiology;  Laterality: Right;    FEMUR SURGERY Left     HERNIA REPAIR      x4    SHOULDER SURGERY      Right rotator cuff    TOE SURGERY      Left fifth toe       SOCIAL HISTORY:  Social History     Socioeconomic History    Marital status:    Tobacco Use    Smoking status: Never Smoker    Smokeless tobacco: Never Used   Substance and Sexual Activity    Alcohol use: Yes     Comment: beer/whiskey 1-2 qd. Been a month since drinking.    Drug use: Never       FAMILY HISTORY:  Family History   Problem Relation Age of Onset    Lung cancer Father     Heart murmur  Sister          ALLERGIES:  Review of patient's allergies indicates:  No Known Allergies     MEDICATIONS:    Current Outpatient Medications:     ascorbic acid, vitamin C, (VITAMIN C) 1000 MG tablet, Take 1,000 mg by mouth once daily., Disp: , Rfl:     aspirin (ECOTRIN) 81 MG EC tablet, Take 1 tablet (81 mg total) by mouth once daily., Disp: 90 tablet, Rfl: 3    azelastine (ASTELIN) 137 mcg (0.1 %) nasal spray, 1 spray by Nasal route once daily., Disp: , Rfl:     clopidogreL (PLAVIX) 75 mg tablet, Take 1 tablet (75 mg total) by mouth once daily., Disp: 90 tablet, Rfl: 1    cyanocobalamin (VITAMIN B-12) 1000 MCG tablet, Take 1,000 mcg by mouth once daily., Disp: , Rfl:     ergocalciferol (ERGOCALCIFEROL) 50,000 unit Cap, Take 50,000 Units by mouth every 7 days., Disp: , Rfl:     fexofenadine (ALLEGRA) 180 MG tablet, Take 180 mg by mouth once daily., Disp: , Rfl:     fluticasone propionate (FLONASE) 50 mcg/actuation nasal spray, 1 spray by Each Nostril route once daily., Disp: , Rfl:     guaiFENesin 1,200 mg Ta12, Take 1,200 mg by mouth every 4 (four) hours as needed., Disp: , Rfl:     lisinopriL-hydrochlorothiazide (PRINZIDE,ZESTORETIC) 10-12.5 mg per tablet, Take 1 tablet by mouth once daily., Disp: , Rfl:     omeprazole (PRILOSEC) 20 MG capsule, Take 20 mg by mouth 2 (two) times daily., Disp: , Rfl:     pramipexole (MIRAPEX) 0.5 MG tablet, Take 0.5 mg by mouth nightly as needed., Disp: , Rfl:     rosuvastatin (CRESTOR) 20 MG tablet, Take 20 mg by mouth once daily. Take 1/2 po daily., Disp: , Rfl:     tamsulosin (FLOMAX) 0.4 mg Cap, Take 0.4 mg by mouth 2 (two) times daily., Disp: , Rfl:     triamcinolone acetonide 0.025% (KENALOG) 0.025 % cream, Apply 0.025 % topically once daily., Disp: , Rfl:     vit C/E/Zn/coppr/lutein/zeaxan (PRESERVISION AREDS-2 ORAL), Take 1 tablet by mouth once daily., Disp: , Rfl:      PHYSICAL EXAM:  /84 (BP Location: Left arm, Patient Position: Sitting, BP Method:  "Large (Manual))   Pulse 60   Resp 18   Ht 5' 11" (1.803 m)   Wt 84.4 kg (186 lb)   BMI 25.94 kg/m²   Wt Readings from Last 3 Encounters:   01/05/22 84.4 kg (186 lb)   06/23/21 81.2 kg (179 lb)   05/03/21 81.6 kg (180 lb)      Body mass index is 25.94 kg/m².    Physical Exam  Vitals reviewed.   Constitutional:       Appearance: Normal appearance.   HENT:      Head: Normocephalic and atraumatic.   Neck:      Vascular: No carotid bruit or JVD.   Cardiovascular:      Rate and Rhythm: Normal rate and regular rhythm.      Pulses: Normal pulses.           Radial pulses are 2+ on the right side and 2+ on the left side.        Dorsalis pedis pulses are 2+ on the right side and 2+ on the left side.      Heart sounds: Normal heart sounds.   Pulmonary:      Effort: Pulmonary effort is normal.      Breath sounds: Normal breath sounds.   Musculoskeletal:      Right lower leg: No edema.      Left lower leg: No edema.   Skin:     General: Skin is warm and dry.   Neurological:      Mental Status: He is alert.         LABS REVIEWED:  Lab Results   Component Value Date    WBC 4.36 (L) 05/04/2021    RBC 4.08 (L) 05/04/2021    HGB 13.1 (L) 05/04/2021    HCT 40.0 05/04/2021    MCV 98.0 (H) 05/04/2021    MCH 32.1 (H) 05/04/2021    MCHC 32.8 05/04/2021    RDW 13.7 05/04/2021     05/04/2021    MPV 10.8 05/04/2021    NRBC 0.0 05/04/2021     Lab Results   Component Value Date     04/15/2021    K 4.5 04/15/2021     04/15/2021    CO2 29 04/15/2021    BUN 12 04/15/2021    MG 2.5 (H) 03/26/2020     Lab Results   Component Value Date    AST 49 (H) 04/15/2021    ALT 57 04/15/2021     Lab Results   Component Value Date     (H) 04/15/2021     Lab Results   Component Value Date    CHOL 127 05/03/2021    HDL 32 (L) 05/03/2021    TRIG 122 05/03/2021    CHOLHDL 4.0 05/03/2021       CARDIAC STUDIES REVIEWED:EKG: NSR with sinus arrhythmia, 72 bpm        ASSESSMENT:   Active Problem List with Overview Notes    Diagnosis Date " Noted    Coronary artery disease 05/03/2021    Essential hypertension, benign 04/21/2021    VERA (dyspnea on exertion) 04/07/2021    ASHLY (obstructive sleep apnea) 04/07/2021     intolerant to CPAP      Hypercholesteremia 04/07/2021    Claudication 04/07/2021    Sinus tachycardia 04/07/2021    Chest pain 04/07/2021     VISIT DIAGNOSIS:  Coronary artery disease involving native heart without angina pectoris, unspecified vessel or lesion type  Comments:  s/p LAD stent    Orders:  -     EKG 12-lead; Future; Expected date: 01/05/2022    Hypercholesteremia    Essential hypertension, benign    ASHLY (obstructive sleep apnea)         PLAN: FLP/ ALT followed by Dr. Motta.     Orders Placed This Encounter   Procedures    EKG 12-lead     Standing Status:   Future     Number of Occurrences:   1     Standing Expiration Date:   2/5/2022     Order Specific Question:   Diagnosis     Answer:   CAD (coronary artery disease) [[id number]]      RTC 6 months.

## 2022-03-11 DIAGNOSIS — Z71.89 COMPLEX CARE COORDINATION: ICD-10-CM

## 2022-04-15 ENCOUNTER — OFFICE VISIT (OUTPATIENT)
Dept: DERMATOLOGY | Facility: CLINIC | Age: 72
End: 2022-04-15
Payer: MEDICARE

## 2022-04-15 VITALS — RESPIRATION RATE: 19 BRPM | WEIGHT: 186 LBS | HEIGHT: 71 IN | BODY MASS INDEX: 26.04 KG/M2

## 2022-04-15 DIAGNOSIS — L73.9 FOLLICULITIS: ICD-10-CM

## 2022-04-15 DIAGNOSIS — L57.0 ACTINIC KERATOSES: Primary | ICD-10-CM

## 2022-04-15 PROCEDURE — 17003 DESTRUCT PREMALG LES 2-14: CPT | Mod: ,,, | Performed by: STUDENT IN AN ORGANIZED HEALTH CARE EDUCATION/TRAINING PROGRAM

## 2022-04-15 PROCEDURE — 17000 PR DESTRUCTION(LASER SURGERY,CRYOSURGERY,CHEMOSURGERY),PREMALIGNANT LESIONS,FIRST LESION: ICD-10-PCS | Mod: ,,, | Performed by: STUDENT IN AN ORGANIZED HEALTH CARE EDUCATION/TRAINING PROGRAM

## 2022-04-15 PROCEDURE — 99204 PR OFFICE/OUTPT VISIT, NEW, LEVL IV, 45-59 MIN: ICD-10-PCS | Mod: 25,,, | Performed by: STUDENT IN AN ORGANIZED HEALTH CARE EDUCATION/TRAINING PROGRAM

## 2022-04-15 PROCEDURE — 17003 DESTRUCTION, PREMALIGNANT LESIONS; SECOND THROUGH 14 LESIONS: ICD-10-PCS | Mod: ,,, | Performed by: STUDENT IN AN ORGANIZED HEALTH CARE EDUCATION/TRAINING PROGRAM

## 2022-04-15 PROCEDURE — 17000 DESTRUCT PREMALG LESION: CPT | Mod: ,,, | Performed by: STUDENT IN AN ORGANIZED HEALTH CARE EDUCATION/TRAINING PROGRAM

## 2022-04-15 PROCEDURE — 99204 OFFICE O/P NEW MOD 45 MIN: CPT | Mod: 25,,, | Performed by: STUDENT IN AN ORGANIZED HEALTH CARE EDUCATION/TRAINING PROGRAM

## 2022-04-15 RX ORDER — CLINDAMYCIN PHOSPHATE 11.9 MG/ML
SOLUTION TOPICAL 2 TIMES DAILY
Qty: 60 ML | Refills: 2 | Status: SHIPPED | OUTPATIENT
Start: 2022-04-15 | End: 2022-07-14

## 2022-04-15 NOTE — PROGRESS NOTES
[facility]  Silviano Rios MD    [address] [place] [address]  [phone]    Fax: [phone]    Patient Name: Agustin Hahn  Medical Record Number: [id number]  PCP: Henry Acosta MD  Age: 71 y.o. : 1950  Contact: [phone] (home)     CC: open sores in the scalp and nose  History of Present Illness:     Agustin Hahn is a 71 y.o.  male with no history of skin cancer  who presents to clinic today for recurrent sores in his scalp.  This has been present for a year. Symptoms include tenderness and oozing. Previous treatments include TAC cream. Other concerns today are none.       The patient has no other concerns today.    Review of Systems:     Unremarkable other than mentioned above.     Physical Exam:     General: Relaxed, oriented, alert    Skin examination of the scalp, face, neck, chest, back, abdomen, upper extremities and lower extremities were normal except for as listed below        Assessment and Plan:     1. Actinic Keratoses  Erythematous, scaly papules on R cheek,L ear     Plan: Liquid Nitrogen.  A total of 2 lesions were treated with liquid nitrogen for 2 freeze-thaw cycles lasting 5 seconds, located on the above locations.   The patient's consent was obtained including but not limited to risks of crusting, scabbing,  blistering, scarring, darker or lighter pigmentary change, recurrence, incomplete removal and infection.    Counseling.  Sun protective clothing and broad spectrum sunscreen can prevent the formation of AK.   AKs can be treated with cryotherapy, photodynamic therapy, imiquimod, topical 5-FU.  Actinic Keratoses are precancerous proliferations that occur within sun damaged skin. If untreated,  a small subset of AKs can develop into Squamous Cell Carcinoma.    2. Folliculitis  -follicular based pustules    Plan:   Hibiclens in the shower 2-3x weekly   Clindamycin solution bid in scalp    Counseling  Patient instructed to apply  antibacterial soap and/or benzoyl peroxide wash to affected areas in shower.  Expectations: Folliculitis is an infection of the hair follicle that can persist for several weeks.        3. Seborrheic keratoses   - brown stuck on appearing papules/plaques  - patient educated on benign nature. No treatment necessary unless they become irritated or inflamed       Return to clinic in 1 year.     AVS printed with patient instructions     Silviano Rios MD   Mohs Surgery/Dermatologic Oncology  Dermatology

## 2022-04-18 RX ORDER — CLOPIDOGREL BISULFATE 75 MG/1
75 TABLET ORAL DAILY
Qty: 90 TABLET | Refills: 3 | Status: SHIPPED | OUTPATIENT
Start: 2022-04-18 | End: 2023-01-19

## 2022-07-14 ENCOUNTER — OFFICE VISIT (OUTPATIENT)
Dept: CARDIOLOGY | Facility: CLINIC | Age: 72
End: 2022-07-14
Payer: OTHER GOVERNMENT

## 2022-07-14 VITALS
HEART RATE: 81 BPM | SYSTOLIC BLOOD PRESSURE: 116 MMHG | HEIGHT: 71 IN | BODY MASS INDEX: 25.62 KG/M2 | OXYGEN SATURATION: 98 % | WEIGHT: 183 LBS | DIASTOLIC BLOOD PRESSURE: 60 MMHG

## 2022-07-14 DIAGNOSIS — I25.10 CORONARY ARTERY DISEASE, UNSPECIFIED VESSEL OR LESION TYPE, UNSPECIFIED WHETHER ANGINA PRESENT, UNSPECIFIED WHETHER NATIVE OR TRANSPLANTED HEART: Primary | ICD-10-CM

## 2022-07-14 PROCEDURE — 93005 ELECTROCARDIOGRAM TRACING: CPT | Mod: PBBFAC | Performed by: INTERNAL MEDICINE

## 2022-07-14 PROCEDURE — 99214 PR OFFICE/OUTPT VISIT, EST, LEVL IV, 30-39 MIN: ICD-10-PCS | Mod: S$PBB,,, | Performed by: NURSE PRACTITIONER

## 2022-07-14 PROCEDURE — 93010 EKG 12-LEAD: ICD-10-PCS | Mod: S$PBB,,, | Performed by: INTERNAL MEDICINE

## 2022-07-14 PROCEDURE — 93010 ELECTROCARDIOGRAM REPORT: CPT | Mod: S$PBB,,, | Performed by: INTERNAL MEDICINE

## 2022-07-14 PROCEDURE — 99214 OFFICE O/P EST MOD 30 MIN: CPT | Mod: S$PBB,,, | Performed by: NURSE PRACTITIONER

## 2022-07-14 PROCEDURE — 99214 OFFICE O/P EST MOD 30 MIN: CPT | Mod: PBBFAC | Performed by: NURSE PRACTITIONER

## 2022-07-14 RX ORDER — CHOLECALCIFEROL (VITAMIN D3) 25 MCG
1000 TABLET ORAL DAILY
COMMUNITY

## 2022-07-14 NOTE — PROGRESS NOTES
[facility] note        DATE OF SERVICE: 07/14/2022       PCP: Henry Acosta MD      CHIEF COMPLAINT:   Chief Complaint   Patient presents with    Shortness of Breath     With exertion.    Palpitations     On occasion        HISTORY OF PRESENT ILLNESS:  Agustin Hahn is a 71 y.o. male with a PMH of   Past Medical History:   Diagnosis Date    Coronary artery disease     Hyperlipidemia     Hypertension     ASHLY (obstructive sleep apnea)      who presents for routine follow up. He denies chest pain, pressure, heaviness, tightness, syncope or near syncope. He has some VERA but states that is much better since her had his LHC/SUAD. On occasion he has a palpitation of short duration and no associated symptoms.   Chief Complaint   Patient presents with    Shortness of Breath     With exertion.    Palpitations     On occasion            PAST MEDICAL HISTORY:  Past Medical History:   Diagnosis Date    Coronary artery disease     Hyperlipidemia     Hypertension     ASHLY (obstructive sleep apnea)        PAST SURGICAL HISTORY:  Past Surgical History:   Procedure Laterality Date    ANGIOGRAM, CORONARY, WITH LEFT HEART CATHETERIZATION Right 5/3/2021    Procedure: Angiogram, Coronary, with Left Heart Cath;  Surgeon: Henry Acosta MD;  Location: [facility];  Service: Cardiology;  Laterality: Right;    FEMUR SURGERY Left     HERNIA REPAIR      x4    SHOULDER SURGERY      Right rotator cuff    TOE SURGERY      Left fifth toe       SOCIAL HISTORY:  Social History     Socioeconomic History    Marital status:    Tobacco Use    Smoking status: Never Smoker    Smokeless tobacco: Never Used   Substance and Sexual Activity    Alcohol use: Yes     Comment: beer/whiskey 1-2 qd. Been a month since drinking.    Drug use: Never       FAMILY HISTORY:  Family History   Problem Relation Age of Onset    Lung cancer Father     Heart murmur Sister          ALLERGIES:  Review of patient's  allergies indicates:  No Known Allergies     MEDICATIONS:    Current Outpatient Medications:     aspirin (ECOTRIN) 81 MG EC tablet, Take 1 tablet (81 mg total) by mouth once daily., Disp: 90 tablet, Rfl: 3    azelastine (ASTELIN) 137 mcg (0.1 %) nasal spray, 1 spray by Nasal route once daily., Disp: , Rfl:     clopidogreL (PLAVIX) 75 mg tablet, Take 1 tablet (75 mg total) by mouth once daily., Disp: 90 tablet, Rfl: 3    cyanocobalamin (VITAMIN B-12) 1000 MCG tablet, Take 1,000 mcg by mouth once daily., Disp: , Rfl:     fluticasone propionate (FLONASE) 50 mcg/actuation nasal spray, 1 spray by Each Nostril route once daily., Disp: , Rfl:     lisinopriL-hydrochlorothiazide (PRINZIDE,ZESTORETIC) 10-12.5 mg per tablet, Take 1 tablet by mouth once daily., Disp: , Rfl:     omeprazole (PRILOSEC) 20 MG capsule, Take 20 mg by mouth 2 (two) times daily., Disp: , Rfl:     rosuvastatin (CRESTOR) 20 MG tablet, Take 20 mg by mouth once daily. Take 1/2 po daily., Disp: , Rfl:     tamsulosin (FLOMAX) 0.4 mg Cap, Take 0.4 mg by mouth 2 (two) times daily., Disp: , Rfl:     triamcinolone acetonide 0.025% (KENALOG) 0.025 % cream, Apply 0.025 % topically once daily., Disp: , Rfl:     vitamin D (VITAMIN D3) 1000 units Tab, Take 1,000 Units by mouth once daily., Disp: , Rfl:     ascorbic acid, vitamin C, (VITAMIN C) 1000 MG tablet, Take 1,000 mg by mouth once daily., Disp: , Rfl:     ergocalciferol (ERGOCALCIFEROL) 50,000 unit Cap, Take 50,000 Units by mouth every 7 days., Disp: , Rfl:     fexofenadine (ALLEGRA) 180 MG tablet, Take 180 mg by mouth once daily., Disp: , Rfl:     guaiFENesin 1,200 mg Ta12, Take 1,200 mg by mouth every 4 (four) hours as needed., Disp: , Rfl:     pramipexole (MIRAPEX) 0.5 MG tablet, Take 0.5 mg by mouth nightly as needed., Disp: , Rfl:     vit C/E/Zn/coppr/lutein/zeaxan (PRESERVISION AREDS-2 ORAL), Take 1 tablet by mouth once daily., Disp: , Rfl:   Medications have been reviewed and reconciled.  "    PHYSICAL EXAM:  /60 (BP Location: Left arm, Patient Position: Sitting)   Pulse 81   Ht 5' 11" (1.803 m)   Wt 83 kg (183 lb)   SpO2 98%   BMI 25.52 kg/m²   Wt Readings from Last 3 Encounters:   07/14/22 83 kg (183 lb)   04/15/22 84.4 kg (186 lb)   01/05/22 84.4 kg (186 lb)      Body mass index is 25.52 kg/m².    Physical Exam  Vitals and nursing note reviewed.   Constitutional:       Appearance: Normal appearance. He is normal weight.   HENT:      Head: Normocephalic and atraumatic.   Eyes:      Pupils: Pupils are equal, round, and reactive to light.   Neck:      Vascular: No carotid bruit.   Cardiovascular:      Rate and Rhythm: Normal rate and regular rhythm.      Pulses: Normal pulses.      Heart sounds: Normal heart sounds.   Pulmonary:      Effort: Pulmonary effort is normal.      Breath sounds: Normal breath sounds.   Abdominal:      General: Bowel sounds are normal.      Palpations: Abdomen is soft.   Musculoskeletal:      Cervical back: Neck supple.      Right lower leg: No edema.      Left lower leg: No edema.   Skin:     General: Skin is warm and dry.      Capillary Refill: Capillary refill takes less than 2 seconds.   Neurological:      General: No focal deficit present.      Mental Status: He is alert and oriented to person, place, and time.   Psychiatric:         Mood and Affect: Mood normal.         Behavior: Behavior normal.         LABS REVIEWED:  Lab Results   Component Value Date    WBC 4.36 (L) 05/04/2021    RBC 4.08 (L) 05/04/2021    HGB 13.1 (L) 05/04/2021    HCT 40.0 05/04/2021    MCV 98.0 (H) 05/04/2021    MCH 32.1 (H) 05/04/2021    MCHC 32.8 05/04/2021    RDW 13.7 05/04/2021     05/04/2021    MPV 10.8 05/04/2021    NRBC 0.0 05/04/2021     Lab Results   Component Value Date     04/15/2021    K 4.5 04/15/2021     04/15/2021    CO2 29 04/15/2021    BUN 12 04/15/2021    MG 2.5 (H) 03/26/2020     Lab Results   Component Value Date    AST 49 (H) 04/15/2021    ALT 57 " 04/15/2021     Lab Results   Component Value Date     (H) 04/15/2021     Lab Results   Component Value Date    CHOL 127 05/03/2021    HDL 32 (L) 05/03/2021    TRIG 122 05/03/2021    CHOLHDL 4.0 05/03/2021       CARDIAC STUDIES REVIEWED:EKG: RSR with HR 72 bpm; Poor R wave progression; also presnet on EKG 1/5/2022  Stress test  Results for orders placed during the hospital encounter of 04/15/21    Exercise Stress - EKG    Interpretation Summary    The EKG portion of this study is negative for ischemia.    The patient reported chest pain during the stress test.    The blood pressure response to stress was normal.    During stress, rare PVCs are noted.    Results:    1. Patient reached target heart rate 1 minutes, 16 seconds into stage II.    2. Patient experienced chest pain during exertion, resolved with rest.    3. Frequent PVCs during exercise.    4. No significant ST segment changes during exercise to suggest ischemia.    Conclusion:    Clinically positive, electrically negative stress test at 6.1 METS.     echocardiogram  Results for orders placed during the hospital encounter of 04/15/21    Echo Color Flow Doppler? Yes    Interpretation Summary  · The left ventricle is normal in size with normal systolic function.  · The estimated ejection fraction is 60%.  · Normal left ventricular diastolic function.  · Normal right ventricular size with normal right ventricular systolic function.  · Mild tricuspid regurgitation.  · Rhythm is sinus, rate 85 bpm.     Heart cath  Results for orders placed during the hospital encounter of 05/03/21    Cardiac catheterization    Conclusion  · The left ventricular systolic function was normal.  · The left ventricular end diastolic pressure was normal.  · The pre-procedure left ventricular end diastolic pressure was 18.  · The ejection fraction was calculated to be 60%.  · The left ventricular ejection fraction was grossly normal.  · There was no mitral valve  regurgitation.  · The Prox LAD to Mid LAD lesion was 75% stenosed with 0% stenosis post-intervention.  · A stent was not successfully placed.  · A stent was successfully placed at 12 JL for 8 sec.  · A stent was not successfully placed.  · A stent was successfully placed at 16 JL for 10 sec.  · The Prox LAD lesion was 90% stenosed with 0% stenosis post-intervention.  · A stent was successfully placed at 12 JL for 10 sec.  · The estimated blood loss was none.  · There was single vessel coronary artery disease.    The procedure log was documented by Documenter: RT Goyo and verified by Henry Acosta MD.    Date: 5/3/2021  Time: 5:02 PM    Findings:    1. Severe single-vessel coronary artery disease of the proximal to mid LAD.    2. Normal left ventricular size and systolic function, ejection fraction 60%.    3. No significant mitral regurgitation    4. IFR assessment of proximal to mid LAD lesion giving of IFR of 0.82.    5. Successful PTCA and stent of the mid to proximal LAD using a 3.0 x 38 mm Xience stent overlapped proximally by a 3.25 by 23 mm Xience stent overlapped proximally by a 3.5 x 12 mm Xience stent.    Plan:    1. Aspirin and Brilinta daily    2. Of his on tele overnight.    3. Risk factor modification.           ASSESSMENT:   Patient Active Problem List   Diagnosis    VERA (dyspnea on exertion)    ASHLY (obstructive sleep apnea)    Hypercholesteremia    Claudication    Sinus tachycardia    Chest pain    Essential hypertension, benign    Coronary artery disease            Problem List Items Addressed This Visit        Cardiac/Vascular    Coronary artery disease - Primary    Overview     5/3/2021   SUAD prox LADXience Meme 3.5 mm x 12 mm  SUAD prox LAD to mid LAD- Xience Zunilda 3.25 x 23 mm           Relevant Orders    EKG 12-lead           PLAN: The patient is having a colonoscopy next month. His SUAD was 5/2021. He may hold his plavix for the procedure and restart asap after  the procedure. He is interested in stopping his plavix all together. I told him that this would be Dr. Acosta's decision and I will d/w Dr. Acosta and our office will let him know what he says.  Orders Placed This Encounter   Procedures    EKG 12-lead     Standing Status:   Future     Number of Occurrences:   1     Standing Expiration Date:   7/14/2023      RTC: 6 months

## 2022-07-19 ENCOUNTER — TELEPHONE (OUTPATIENT)
Dept: CARDIOLOGY | Facility: CLINIC | Age: 72
End: 2022-07-19
Payer: MEDICARE

## 2022-07-19 NOTE — TELEPHONE ENCOUNTER
Notified pt that Dr. Acosta said it is okay to stop his Plavix (per pt request).  Instructed to continue ASA 81 mg daily.  Voiced understanding.

## 2022-11-09 DIAGNOSIS — Z71.89 COMPLEX CARE COORDINATION: ICD-10-CM

## 2023-01-19 ENCOUNTER — OFFICE VISIT (OUTPATIENT)
Dept: CARDIOLOGY | Facility: CLINIC | Age: 73
End: 2023-01-19
Payer: MEDICARE

## 2023-01-19 VITALS
BODY MASS INDEX: 26.09 KG/M2 | WEIGHT: 186.38 LBS | HEART RATE: 88 BPM | OXYGEN SATURATION: 97 % | DIASTOLIC BLOOD PRESSURE: 78 MMHG | SYSTOLIC BLOOD PRESSURE: 116 MMHG | HEIGHT: 71 IN

## 2023-01-19 DIAGNOSIS — E78.5 HYPERLIPIDEMIA, UNSPECIFIED HYPERLIPIDEMIA TYPE: ICD-10-CM

## 2023-01-19 DIAGNOSIS — Z79.899 HIGH RISK MEDICATION USE: ICD-10-CM

## 2023-01-19 DIAGNOSIS — I25.10 CORONARY ARTERY DISEASE, UNSPECIFIED VESSEL OR LESION TYPE, UNSPECIFIED WHETHER ANGINA PRESENT, UNSPECIFIED WHETHER NATIVE OR TRANSPLANTED HEART: Primary | ICD-10-CM

## 2023-01-19 PROCEDURE — 3074F SYST BP LT 130 MM HG: CPT | Mod: CPTII,,, | Performed by: NURSE PRACTITIONER

## 2023-01-19 PROCEDURE — 3074F PR MOST RECENT SYSTOLIC BLOOD PRESSURE < 130 MM HG: ICD-10-PCS | Mod: CPTII,,, | Performed by: NURSE PRACTITIONER

## 2023-01-19 PROCEDURE — 93005 ELECTROCARDIOGRAM TRACING: CPT | Mod: PBBFAC | Performed by: INTERNAL MEDICINE

## 2023-01-19 PROCEDURE — 3078F DIAST BP <80 MM HG: CPT | Mod: CPTII,,, | Performed by: NURSE PRACTITIONER

## 2023-01-19 PROCEDURE — 3078F PR MOST RECENT DIASTOLIC BLOOD PRESSURE < 80 MM HG: ICD-10-PCS | Mod: CPTII,,, | Performed by: NURSE PRACTITIONER

## 2023-01-19 PROCEDURE — 1160F RVW MEDS BY RX/DR IN RCRD: CPT | Mod: CPTII,,, | Performed by: NURSE PRACTITIONER

## 2023-01-19 PROCEDURE — 3008F PR BODY MASS INDEX (BMI) DOCUMENTED: ICD-10-PCS | Mod: CPTII,,, | Performed by: NURSE PRACTITIONER

## 2023-01-19 PROCEDURE — 99214 OFFICE O/P EST MOD 30 MIN: CPT | Mod: S$PBB,,, | Performed by: NURSE PRACTITIONER

## 2023-01-19 PROCEDURE — 3008F BODY MASS INDEX DOCD: CPT | Mod: CPTII,,, | Performed by: NURSE PRACTITIONER

## 2023-01-19 PROCEDURE — 1159F PR MEDICATION LIST DOCUMENTED IN MEDICAL RECORD: ICD-10-PCS | Mod: CPTII,,, | Performed by: NURSE PRACTITIONER

## 2023-01-19 PROCEDURE — 99214 OFFICE O/P EST MOD 30 MIN: CPT | Mod: PBBFAC | Performed by: NURSE PRACTITIONER

## 2023-01-19 PROCEDURE — 99214 PR OFFICE/OUTPT VISIT, EST, LEVL IV, 30-39 MIN: ICD-10-PCS | Mod: S$PBB,,, | Performed by: NURSE PRACTITIONER

## 2023-01-19 PROCEDURE — 93010 EKG 12-LEAD: ICD-10-PCS | Mod: S$PBB,,, | Performed by: INTERNAL MEDICINE

## 2023-01-19 PROCEDURE — 1159F MED LIST DOCD IN RCRD: CPT | Mod: CPTII,,, | Performed by: NURSE PRACTITIONER

## 2023-01-19 PROCEDURE — 1160F PR REVIEW ALL MEDS BY PRESCRIBER/CLIN PHARMACIST DOCUMENTED: ICD-10-PCS | Mod: CPTII,,, | Performed by: NURSE PRACTITIONER

## 2023-01-19 PROCEDURE — 93010 ELECTROCARDIOGRAM REPORT: CPT | Mod: S$PBB,,, | Performed by: INTERNAL MEDICINE

## 2023-01-19 RX ORDER — IPRATROPIUM BROMIDE 21 UG/1
2 SPRAY, METERED NASAL
COMMUNITY

## 2023-01-19 RX ORDER — CLINDAMYCIN PHOSPHATE 10 UG/ML
LOTION TOPICAL 2 TIMES DAILY
COMMUNITY
End: 2023-04-18

## 2023-01-19 NOTE — PROGRESS NOTES
Progress Note      Patient Name: Sathya Benavides   Patient ID: [id number]   Sex: Male   YOB: 1960    Primary Care Provider: Nery LUNA   Referring Provider: Nery LUNA    Visit Date: April 30, 2019    Provider: Adi Kaminski MD   Location: [facility]   Location Address: [address] [place] [address]  [id number]   Location Phone: [phone]          Chief Complaint     depression       History Of Present Illness  Sathya Benavides is a 58 year old /White male who presents for evaluation and treatment of:      pt has had a lot of family events from loss of girlfriend recently to recent death of friend from heart surgery- pt having depression and anxiety- no SI or HI  pt says that Zoloft not able to tolerate- makes him feel very weird  pt said that Chantix helped his depression the most       Past Medical History  Disease Name Date Onset Notes   Acid reflux --  --    Anxiety --  --    Depression --  --          Past Surgical History  Procedure Name Date Notes   *Denies any surgical procedures --  --          Medication List  Name Date Started Instructions   Flomax 0.4 mg oral capsule 01/24/2019 TAKE ONE CAPSULE BY MOUTH TWICE A DAY   Tagamet  mg oral tablet 03/11/2019 TAKE ONE TABLET BY MOUTH TWICE A DAY 30 MINUTES BEFORE MEALS   trazodone 50 mg oral tablet 01/24/2019 TAKE ONE TABLET BY MOUTH EVERY NIGHT AT BEDTIME FOR 30 DAYS         Allergy List  Allergen Name Date Reaction Notes   Augmentin --  vomiting --          Family Medical History  Disease Name Relative/Age Notes   *No Known Family History  --          Social History  Finding Status Start/Stop Quantity Notes   Alcohol Never --/-- --  --    Tobacco Current every day --/-- 1 ppd smoker x 20 yrs         Immunizations  NameDate Admin Mfg Trade Name Lot Number Route Inj VIS Given VIS Publication   Snslfuyrw67/17/2017 UNK Unknown TradeName [id number] NE NE 03/15/2018 08/07/2015   Comments:  [facility] note        DATE OF SERVICE: 01/19/2023       PCP: Henry Acosta MD      CHIEF COMPLAINT:   Chief Complaint   Patient presents with    Shortness of Breath     With exertion        HISTORY OF PRESENT ILLNESS:  Agustin Hahn is a 72 y.o. male with a PMH of   Past Medical History:   Diagnosis Date    Coronary artery disease     Hyperlipidemia     Hypertension     ASHLY (obstructive sleep apnea)      who presents for routine follow up. He states that he is doing well and has his usual, chronic VERA but nothing like the SOB he had prior to his SUAD.     7/14/2022 routine follow up. He denies chest pain, pressure, heaviness, tightness, syncope or near syncope. He has some VERA but states that is much better since her had his LHC/SUAD. On occasion he has a palpitation of short duration and no associated symptoms.   Chief Complaint   Patient presents with    Shortness of Breath     With exertion            PAST MEDICAL HISTORY:  Past Medical History:   Diagnosis Date    Coronary artery disease     Hyperlipidemia     Hypertension     ASHLY (obstructive sleep apnea)        PAST SURGICAL HISTORY:  Past Surgical History:   Procedure Laterality Date    ANGIOGRAM, CORONARY, WITH LEFT HEART CATHETERIZATION Right 5/3/2021    Procedure: Angiogram, Coronary, with Left Heart Cath;  Surgeon: Henry Acosta MD;  Location: [facility] CATH LAB;  Service: Cardiology;  Laterality: Right;    FEMUR SURGERY Left     HERNIA REPAIR      x4    SHOULDER SURGERY      Right rotator cuff    TOE SURGERY      Left fifth toe       SOCIAL HISTORY:  Social History     Socioeconomic History    Marital status:    Tobacco Use    Smoking status: Never    Smokeless tobacco: Never   Substance and Sexual Activity    Alcohol use: Not Currently     Comment: beer/whiskey 1-2 qd. Been a month since drinking.    Drug use: Never       FAMILY HISTORY:  Family History   Problem Relation Age of Onset    Lung cancer Father     Heart murmur     Tdap05/17/2017 [facility] ADACEL [id number] NE NE 03/29/2018 02/24/2015   Comments:          Review of Systems  · Constitutional  o Admits  o : fatigue  o Denies  o : fever  · Cardiovascular  o Denies  o : chest pain, palpitations  · Respiratory  o Denies  o : shortness of breath, cough  · Gastrointestinal  o Denies  o : nausea, vomiting, diarrhea  · Psychiatric  o Admits  o : anxiety, depression, difficulty sleeping  o Denies  o : suicidal ideation, homicidal ideation      Vitals  Date Time BP Position Site L\R Cuff Size HR RR TEMP (F) WT  HT  BMI kg/m2 BSA m2 O2 Sat        04/30/2019 02:42 /70 Sitting    100 - R  97.8 160lbs 4oz    97 %          Physical Examination  · Constitutional  o Appearance  o : well developed, well-nourished, in no acute distress  · Head and Face  o HEENT  o : Unremarkable  · Respiratory  o Respiratory Effort  o : breathing unlabored  o Auscultation of Lungs  o : clear to ascultation  · Cardiovascular  o Heart  o :   § Auscultation of Heart  § : regular rate and rhythm  o Peripheral Vascular System  o :   § Extremities  § : no edema  · Gastrointestinal  o Abdomen  o : soft, non-tender, non-distended, + bowel sounds, no hepatosplenomegaly, no masses palpated  · Musculoskeletal  o General  o :   § General Musculoskeletal  § : No joint swelling or deformity., Muscle tone, strength, and development grossly normal.  · Neurologic  o Gait and Station  o :   § Gait Screening  § : normal gait  · Psychiatric  o Mood and Affect  o : mood normal, affect appropriate          Assessment  · Anxiety associated with depression     300.4/F41.8      Plan  · Orders  o CBC with Auto Diff [facility] ([id number]) - 300.4/F41.8 - 04/30/2019  o CMP [facility] ([id number]) - 300.4/F41.8 - 04/30/2019  o TSH [facility] ([id number]) - 300.4/F41.8 - 04/30/2019  o ACO-39: Current medications updated and reviewed () - - 04/30/2019  · Medications  o Wellbutrin  mg oral tablet extended release 24 hr   SIG: take 1 tablet (150 mg) by oral route  "Sister     Stroke Brother          ALLERGIES:  Review of patient's allergies indicates:  No Known Allergies     MEDICATIONS:    Current Outpatient Medications:     aspirin (ECOTRIN) 81 MG EC tablet, Take 1 tablet (81 mg total) by mouth once daily., Disp: 90 tablet, Rfl: 3    cyanocobalamin (VITAMIN B-12) 1000 MCG tablet, Take 1,000 mcg by mouth once daily., Disp: , Rfl:     lisinopriL-hydrochlorothiazide (PRINZIDE,ZESTORETIC) 10-12.5 mg per tablet, Take 1 tablet by mouth once daily., Disp: , Rfl:     omeprazole (PRILOSEC) 20 MG capsule, Take 20 mg by mouth 2 (two) times daily., Disp: , Rfl:     rosuvastatin (CRESTOR) 20 MG tablet, Take 20 mg by mouth once daily. Take 1/2 po daily., Disp: , Rfl:     tamsulosin (FLOMAX) 0.4 mg Cap, Take 0.4 mg by mouth 2 (two) times daily., Disp: , Rfl:     triamcinolone acetonide 0.025% (KENALOG) 0.025 % cream, Apply 0.025 % topically once daily., Disp: , Rfl:     vitamin D (VITAMIN D3) 1000 units Tab, Take 1,000 Units by mouth once daily., Disp: , Rfl:     ascorbic acid, vitamin C, (VITAMIN C) 1000 MG tablet, Take 1,000 mg by mouth once daily., Disp: , Rfl:     azelastine (ASTELIN) 137 mcg (0.1 %) nasal spray, 1 spray by Nasal route once daily., Disp: , Rfl:     clindamycin (CLEOCIN T) 1 % lotion, Apply topically 2 (two) times daily., Disp: , Rfl:     fexofenadine (ALLEGRA) 180 MG tablet, Take 180 mg by mouth once daily., Disp: , Rfl:     fluticasone propionate (FLONASE) 50 mcg/actuation nasal spray, 1 spray by Each Nostril route once daily., Disp: , Rfl:     ipratropium (ATROVENT) 21 mcg (0.03 %) nasal spray, 2 sprays by Each Nostril route every 12 (twelve) hours., Disp: , Rfl:     vit C/E/Zn/coppr/lutein/zeaxan (PRESERVISION AREDS-2 ORAL), Take 1 tablet by mouth once daily., Disp: , Rfl:   Medications have been reviewed and reconciled.     PHYSICAL EXAM:  /78 (BP Location: Left arm, Patient Position: Sitting)   Pulse 88   Ht 5' 11" (1.803 m)   Wt 84.6 kg (186 lb 6.4 oz)  "  SpO2 97%   BMI 26.00 kg/m²   Wt Readings from Last 3 Encounters:   01/19/23 84.6 kg (186 lb 6.4 oz)   07/14/22 83 kg (183 lb)   04/15/22 84.4 kg (186 lb)      Body mass index is 26 kg/m².    Physical Exam  Vitals and nursing note reviewed.   Constitutional:       Appearance: Normal appearance. He is normal weight.   HENT:      Head: Normocephalic and atraumatic.   Eyes:      Pupils: Pupils are equal, round, and reactive to light.   Neck:      Vascular: No carotid bruit.   Cardiovascular:      Rate and Rhythm: Normal rate and regular rhythm.      Pulses: Normal pulses.      Heart sounds: Normal heart sounds.   Pulmonary:      Effort: Pulmonary effort is normal.      Breath sounds: Normal breath sounds.   Abdominal:      General: Bowel sounds are normal.      Palpations: Abdomen is soft.   Musculoskeletal:      Cervical back: Neck supple.      Right lower leg: No edema.      Left lower leg: No edema.   Skin:     General: Skin is warm and dry.      Capillary Refill: Capillary refill takes less than 2 seconds.   Neurological:      General: No focal deficit present.      Mental Status: He is alert and oriented to person, place, and time.   Psychiatric:         Mood and Affect: Mood normal.         Behavior: Behavior normal.       LABS REVIEWED:  Lab Results   Component Value Date    WBC 4.36 (L) 05/04/2021    RBC 4.08 (L) 05/04/2021    HGB 13.1 (L) 05/04/2021    HCT 40.0 05/04/2021    MCV 98.0 (H) 05/04/2021    MCH 32.1 (H) 05/04/2021    MCHC 32.8 05/04/2021    RDW 13.7 05/04/2021     05/04/2021    MPV 10.8 05/04/2021    NRBC 0.0 05/04/2021     Lab Results   Component Value Date     04/15/2021    K 4.5 04/15/2021     04/15/2021    CO2 29 04/15/2021    BUN 12 04/15/2021    MG 2.5 (H) 03/26/2020     Lab Results   Component Value Date    AST 49 (H) 04/15/2021    ALT 57 04/15/2021     Lab Results   Component Value Date     (H) 04/15/2021     Lab Results   Component Value Date    CHOL 127  once daily for 30 days   DISP: (30) tablets with 5 refills  Prescribed on 04/30/2019     o trazodone 50 mg oral tablet   SIG: TAKE ONE TABLET BY MOUTH EVERY NIGHT AT BEDTIME FOR 30 DAYS   DISP: (30) Tablet with 5 refills  Adjusted on 04/30/2019     · Instructions  o Patient was educated/instructed on their diagnosis, treatment and medications prior to discharge from the clinic today.  o pt has been instructed to be off work for 1 week- he will follow-up for reevaluation and decide if he can get back to work.            Electronically Signed by: Adi Kaminski MD -Author on April 30, 2019 03:26:19 PM   2021    HDL 32 (L) 2021    TRIG 122 2021    CHOLHDL 4.0 2021       CARDIAC STUDIES REVIEWED:EK2023 RSR HR 83 bpm; normal EKG  2022 RSR with HR 72 bpm; Poor R wave progression; also presnet on EKG 2022  Stress test  Results for orders placed during the hospital encounter of 04/15/21    Exercise Stress - EKG    Interpretation Summary    The EKG portion of this study is negative for ischemia.    The patient reported chest pain during the stress test.    The blood pressure response to stress was normal.    During stress, rare PVCs are noted.    Results:    1. Patient reached target heart rate 1 minutes, 16 seconds into stage II.    2. Patient experienced chest pain during exertion, resolved with rest.    3. Frequent PVCs during exercise.    4. No significant ST segment changes during exercise to suggest ischemia.    Conclusion:    Clinically positive, electrically negative stress test at 6.1 METS.     echocardiogram  Results for orders placed during the hospital encounter of 04/15/21    Echo Color Flow Doppler? Yes    Interpretation Summary  · The left ventricle is normal in size with normal systolic function.  · The estimated ejection fraction is 60%.  · Normal left ventricular diastolic function.  · Normal right ventricular size with normal right ventricular systolic function.  · Mild tricuspid regurgitation.  · Rhythm is sinus, rate 85 bpm.     Heart cath  Results for orders placed during the hospital encounter of 21    Cardiac catheterization    Conclusion  · The left ventricular systolic function was normal.  · The left ventricular end diastolic pressure was normal.  · The pre-procedure left ventricular end diastolic pressure was 18.  · The ejection fraction was calculated to be 60%.  · The left ventricular ejection fraction was grossly normal.  · There was no mitral valve regurgitation.  · The Prox LAD to Mid LAD lesion was 75% stenosed with 0% stenosis  post-intervention.  · A stent was not successfully placed.  · A stent was successfully placed at 12 JL for 8 sec.  · A stent was not successfully placed.  · A stent was successfully placed at 16 JL for 10 sec.  · The Prox LAD lesion was 90% stenosed with 0% stenosis post-intervention.  · A stent was successfully placed at 12 JL for 10 sec.  · The estimated blood loss was none.  · There was single vessel coronary artery disease.    The procedure log was documented by Documenter: RT Goyo and verified by Henry Acosta MD.    Date: 5/3/2021  Time: 5:02 PM    Findings:    1. Severe single-vessel coronary artery disease of the proximal to mid LAD.    2. Normal left ventricular size and systolic function, ejection fraction 60%.    3. No significant mitral regurgitation    4. IFR assessment of proximal to mid LAD lesion giving of IFR of 0.82.    5. Successful PTCA and stent of the mid to proximal LAD using a 3.0 x 38 mm Xience stent overlapped proximally by a 3.25 by 23 mm Xience stent overlapped proximally by a 3.5 x 12 mm Xience stent.    Plan:    1. Aspirin and Brilinta daily    2. Of his on tele overnight.    3. Risk factor modification.           ASSESSMENT:   Patient Active Problem List   Diagnosis    VERA (dyspnea on exertion)    ASHLY (obstructive sleep apnea)    Hypercholesteremia    Claudication    Sinus tachycardia    Chest pain    Essential hypertension, benign    Coronary artery disease            Problem List Items Addressed This Visit          Cardiac/Vascular    Coronary artery disease - Primary    Overview     5/3/2021   SUAD prox LADXience Meme 3.5 mm x 12 mm  SUAD prox LAD to mid LAD- Xience Zunilda 3.25 x 23 mm         Relevant Orders    EKG 12-lead     Other Visit Diagnoses       High risk medication use        Relevant Orders    ALT (SGPT)    Hyperlipidemia, unspecified hyperlipidemia type        Relevant Orders    Lipid Panel             PLAN: call results of his labs  [facility] [place] [place] [facility] patient's request.  Orders Placed This Encounter   Procedures    Lipid Panel     Standing Status:   Future     Standing Expiration Date:   3/19/2024    ALT (SGPT)     Standing Status:   Future     Standing Expiration Date:   3/19/2024    EKG 12-lead     Standing Status:   Future     Number of Occurrences:   1     Standing Expiration Date:   1/19/2024      RTC: 6 months

## 2023-01-30 DIAGNOSIS — L73.9 FOLLICULITIS: Primary | ICD-10-CM

## 2023-01-30 RX ORDER — CLINDAMYCIN PHOSPHATE 11.9 MG/ML
SOLUTION TOPICAL 2 TIMES DAILY
Qty: 60 ML | Refills: 5 | Status: SHIPPED | OUTPATIENT
Start: 2023-01-30 | End: 2023-04-18

## 2023-04-18 ENCOUNTER — OFFICE VISIT (OUTPATIENT)
Dept: DERMATOLOGY | Facility: CLINIC | Age: 73
End: 2023-04-18
Payer: OTHER GOVERNMENT

## 2023-04-18 DIAGNOSIS — L57.0 ACTINIC KERATOSES: Primary | ICD-10-CM

## 2023-04-18 DIAGNOSIS — L82.1 SEBORRHEIC KERATOSES: ICD-10-CM

## 2023-04-18 DIAGNOSIS — L73.9 FOLLICULITIS: ICD-10-CM

## 2023-04-18 PROCEDURE — 17003 DESTRUCT PREMALG LES 2-14: CPT | Mod: ,,, | Performed by: STUDENT IN AN ORGANIZED HEALTH CARE EDUCATION/TRAINING PROGRAM

## 2023-04-18 PROCEDURE — 17003 DESTRUCTION, PREMALIGNANT LESIONS; SECOND THROUGH 14 LESIONS: ICD-10-PCS | Mod: ,,, | Performed by: STUDENT IN AN ORGANIZED HEALTH CARE EDUCATION/TRAINING PROGRAM

## 2023-04-18 PROCEDURE — 17000 PR DESTRUCTION(LASER SURGERY,CRYOSURGERY,CHEMOSURGERY),PREMALIGNANT LESIONS,FIRST LESION: ICD-10-PCS | Mod: ,,, | Performed by: STUDENT IN AN ORGANIZED HEALTH CARE EDUCATION/TRAINING PROGRAM

## 2023-04-18 PROCEDURE — 17000 DESTRUCT PREMALG LESION: CPT | Mod: ,,, | Performed by: STUDENT IN AN ORGANIZED HEALTH CARE EDUCATION/TRAINING PROGRAM

## 2023-04-18 PROCEDURE — 99213 OFFICE O/P EST LOW 20 MIN: CPT | Mod: 25,,, | Performed by: STUDENT IN AN ORGANIZED HEALTH CARE EDUCATION/TRAINING PROGRAM

## 2023-04-18 PROCEDURE — 99213 PR OFFICE/OUTPT VISIT, EST, LEVL III, 20-29 MIN: ICD-10-PCS | Mod: 25,,, | Performed by: STUDENT IN AN ORGANIZED HEALTH CARE EDUCATION/TRAINING PROGRAM

## 2023-04-18 RX ORDER — CLINDAMYCIN PHOSPHATE 11.9 MG/ML
SOLUTION TOPICAL 2 TIMES DAILY
Qty: 60 ML | Refills: 11 | Status: SHIPPED | OUTPATIENT
Start: 2023-04-18

## 2023-04-18 NOTE — PROGRESS NOTES
[facility]  Silviano Rios MD    [address] [address]  [phone]    Fax: [phone]    Patient Name: Agustin Hahn  Medical Record Number: [id number]  PCP: Henry Acosta MD  Age: 72 y.o. : 1950  Contact: [phone] (home)     History of Present Illness:     Agustin Hahn is a 72 y.o.  male here for follow up of folliculitis and actinic keratoses. Last seen 04/15/22. Treatment plan for his folliculitis includes clindamycin solution bid in scalp which was effective. Patient was also treated for actinic keratoses on the left ear and right cheek with liquid nitrogen which has improved as well. Patient complains of tender skin lesions on the left forehead, bilateral temple and back today.     The patient has no other concerns today.    Review of Systems:     Unremarkable other than mentioned above.     Physical Exam:     General: Relaxed, oriented, alert    Skin examination of the scalp, face, neck, chest, back, abdomen, upper extremities and lower extremities were normal except for as listed below      Assessment and Plan:     1. Actinic Keratoses  Erythematous, scaly papules on left cheek, right temple     Plan: Liquid Nitrogen.  A total of 2 lesions were treated with liquid nitrogen for 2 freeze-thaw cycles lasting 5 seconds, located on the above locations.   The patient's consent was obtained including but not limited to risks of crusting, scabbing,  blistering, scarring, darker or lighter pigmentary change, recurrence, incomplete removal and infection.    Counseling.  Sun protective clothing and broad spectrum sunscreen can prevent the formation of AK.   AKs can be treated with cryotherapy, photodynamic therapy, imiquimod, topical 5-FU.  Actinic Keratoses are precancerous proliferations that occur within sun damaged skin. If untreated,  a small subset of AKs can develop into Squamous Cell Carcinoma.      2. Seborrheic keratoses   - brown  stuck on appearing papules/plaques  - patient educated on benign nature. No treatment necessary unless they become irritated or inflamed     3. Folliculitis  -follicular based pustules    Plan:   -clindamycin solution to scalp BID  Counseling  Patient instructed to apply antibacterial soap and/or benzoyl peroxide wash to affected areas in shower.  Expectations: Folliculitis is an infection of the hair follicle that can persist for several weeks.        Return to clinic in 1 year    AVS printed with patient instructions     Silviano Rios MD   Mohs Surgery/Dermatologic Oncology  Dermatology

## 2023-07-19 ENCOUNTER — OFFICE VISIT (OUTPATIENT)
Dept: CARDIOLOGY | Facility: CLINIC | Age: 73
End: 2023-07-19
Payer: OTHER GOVERNMENT

## 2023-07-19 VITALS
HEIGHT: 71 IN | WEIGHT: 191 LBS | DIASTOLIC BLOOD PRESSURE: 72 MMHG | OXYGEN SATURATION: 96 % | BODY MASS INDEX: 26.74 KG/M2 | SYSTOLIC BLOOD PRESSURE: 108 MMHG | HEART RATE: 95 BPM

## 2023-07-19 DIAGNOSIS — E78.00 HYPERCHOLESTEREMIA: ICD-10-CM

## 2023-07-19 DIAGNOSIS — I25.10 CORONARY ARTERY DISEASE, UNSPECIFIED VESSEL OR LESION TYPE, UNSPECIFIED WHETHER ANGINA PRESENT, UNSPECIFIED WHETHER NATIVE OR TRANSPLANTED HEART: Primary | ICD-10-CM

## 2023-07-19 DIAGNOSIS — R06.09 DOE (DYSPNEA ON EXERTION): ICD-10-CM

## 2023-07-19 DIAGNOSIS — R07.9 CHEST PAIN, UNSPECIFIED TYPE: ICD-10-CM

## 2023-07-19 DIAGNOSIS — I10 ESSENTIAL HYPERTENSION, BENIGN: ICD-10-CM

## 2023-07-19 DIAGNOSIS — I25.10 ATHEROSCLEROSIS OF NATIVE CORONARY ARTERY OF NATIVE HEART WITHOUT ANGINA PECTORIS: ICD-10-CM

## 2023-07-19 PROCEDURE — 99214 PR OFFICE/OUTPT VISIT, EST, LEVL IV, 30-39 MIN: ICD-10-PCS | Mod: S$PBB,,, | Performed by: NURSE PRACTITIONER

## 2023-07-19 PROCEDURE — 93010 EKG 12-LEAD: ICD-10-PCS | Mod: S$PBB,,, | Performed by: INTERNAL MEDICINE

## 2023-07-19 PROCEDURE — 93005 ELECTROCARDIOGRAM TRACING: CPT | Mod: PBBFAC | Performed by: INTERNAL MEDICINE

## 2023-07-19 PROCEDURE — 99214 OFFICE O/P EST MOD 30 MIN: CPT | Mod: S$PBB,,, | Performed by: NURSE PRACTITIONER

## 2023-07-19 PROCEDURE — 93010 ELECTROCARDIOGRAM REPORT: CPT | Mod: S$PBB,,, | Performed by: INTERNAL MEDICINE

## 2023-07-19 PROCEDURE — 99215 OFFICE O/P EST HI 40 MIN: CPT | Mod: PBBFAC | Performed by: NURSE PRACTITIONER

## 2023-07-21 NOTE — ASSESSMENT & PLAN NOTE
5/3/2021   SUAD prox LADXience Meme 3.5 mm x 12 mm  SUAD prox LAD to mid LAD- Xience Zunilda 3.25 x 23 mm

## 2023-07-21 NOTE — PROGRESS NOTES
PCP: Radha Handley NP    Referring Provider:     Subjective:   Agustin Hahn is a 72 y.o. male with hx of CAD status post SUAD proximal to mid LAD 05/03/2021; hyperlipidemia; hypertension; and ASHLY, who presents for routine follow-up.  Patient reports having an episode of chest discomfort approximately 3 weeks ago while sitting.  He described this discomfort as a pressure/throbbing sensation lasting a few seconds and resolved with rest.        Fhx:  Family History   Problem Relation Age of Onset    Lung cancer Father     Heart murmur Sister     Stroke Brother      Shx:   Social History     Socioeconomic History    Marital status:    Tobacco Use    Smoking status: Never    Smokeless tobacco: Never   Substance and Sexual Activity    Alcohol use: Not Currently     Comment: beer/whiskey 1-2 qd. Been a month since drinking.    Drug use: Never       EKG   07/19/2023 regular sinus rhythm with heart rate 83 beats per minute, normal EKG  01/19/2023 regular sinus rhythm, heart rate 83 beats per minute, normal EKG    24 hour Holter monitor 4/15/2021  1. Basic rhythm is sinus, average heart rate 88 beats per minute, minimum heart rate 55 beats per minute, maximum heart rate 158 beats per minute  2. Forty-three PACs  3. Forty-one PVCs   4. No diary, no reported symptoms.     ECHO Results for orders placed during the hospital encounter of 04/15/21    Echo Color Flow Doppler? Yes    Interpretation Summary  · The left ventricle is normal in size with normal systolic function.  · The estimated ejection fraction is 60%.  · Normal left ventricular diastolic function.  · Normal right ventricular size with normal right ventricular systolic function.  · Mild tricuspid regurgitation.  · Rhythm is sinus, rate 85 bpm.    [facility] Results for orders placed during the hospital encounter of 05/03/21    Cardiac catheterization    Conclusion  · The left ventricular systolic function was normal.  · The left ventricular end diastolic  pressure was normal.  · The pre-procedure left ventricular end diastolic pressure was 18.  · The ejection fraction was calculated to be 60%.  · The left ventricular ejection fraction was grossly normal.  · There was no mitral valve regurgitation.  · The Prox LAD to Mid LAD lesion was 75% stenosed with 0% stenosis post-intervention.  · A stent was not successfully placed.  · A stent was successfully placed at 12 JL for 8 sec.  · A stent was not successfully placed.  · A stent was successfully placed at 16 JL for 10 sec.  · The Prox LAD lesion was 90% stenosed with 0% stenosis post-intervention.  · A stent was successfully placed at 12 JL for 10 sec.  · The estimated blood loss was none.  · There was single vessel coronary artery disease.    The procedure log was documented by Documenter: RT Goyo and verified by Henry Acosta MD.    Date: 5/3/2021  Time: 5:02 PM    Findings:    1. Severe single-vessel coronary artery disease of the proximal to mid LAD.    2. Normal left ventricular size and systolic function, ejection fraction 60%.    3. No significant mitral regurgitation    4. IFR assessment of proximal to mid LAD lesion giving of IFR of 0.82.    5. Successful PTCA and stent of the mid to proximal LAD using a 3.0 x 38 mm Xience stent overlapped proximally by a 3.25 by 23 mm Xience stent overlapped proximally by a 3.5 x 12 mm Xience stent.    Plan:    1. Aspirin and Brilinta daily    2. Of his on tele overnight.    3. Risk factor modification.        Lab Results   Component Value Date     04/15/2021    K 4.5 04/15/2021     04/15/2021    CO2 29 04/15/2021    BUN 12 04/15/2021    CREATININE 1.240 04/15/2021    CALCIUM 9.2 04/15/2021    ANIONGAP 9.5 04/15/2021    ESTGFRAFRICA 74 04/15/2021    EGFRNONAA 61 04/15/2021       Lab Results   Component Value Date    CHOL 127 05/03/2021    CHOL 155 04/15/2021     Lab Results   Component Value Date    HDL 32 (L) 05/03/2021    HDL 34  04/15/2021     Lab Results   Component Value Date    LDLCALC 71 05/03/2021    LDLCALC 87 04/15/2021     Lab Results   Component Value Date    TRIG 122 05/03/2021    TRIG 171 04/15/2021     Lab Results   Component Value Date    CHOLHDL 4.0 05/03/2021       Lab Results   Component Value Date    WBC 4.36 (L) 05/04/2021    HGB 13.1 (L) 05/04/2021    HCT 40.0 05/04/2021    MCV 98.0 (H) 05/04/2021     05/04/2021           Current Outpatient Medications:     aspirin (ECOTRIN) 81 MG EC tablet, Take 1 tablet (81 mg total) by mouth once daily., Disp: 90 tablet, Rfl: 3    clindamycin (CLEOCIN T) 1 % external solution, Apply topically 2 (two) times daily., Disp: 60 mL, Rfl: 11    cyanocobalamin (VITAMIN B-12) 1000 MCG tablet, Take 1,000 mcg by mouth once daily., Disp: , Rfl:     ipratropium (ATROVENT) 21 mcg (0.03 %) nasal spray, 2 sprays by Each Nostril route every 12 (twelve) hours., Disp: , Rfl:     lisinopriL-hydrochlorothiazide (PRINZIDE,ZESTORETIC) 10-12.5 mg per tablet, Take 1 tablet by mouth once daily., Disp: , Rfl:     omeprazole (PRILOSEC) 20 MG capsule, Take 20 mg by mouth 2 (two) times daily., Disp: , Rfl:     rosuvastatin (CRESTOR) 20 MG tablet, Take 10 mg by mouth once daily. Take 1/2 po daily. , Disp: , Rfl:     tamsulosin (FLOMAX) 0.4 mg Cap, Take 0.4 mg by mouth 2 (two) times daily., Disp: , Rfl:     triamcinolone acetonide 0.025% (KENALOG) 0.025 % cream, Apply 0.025 % topically once daily., Disp: , Rfl:     vitamin D (VITAMIN D3) 1000 units Tab, Take 1,000 Units by mouth once daily., Disp: , Rfl:     ascorbic acid, vitamin C, (VITAMIN C) 1000 MG tablet, Take 1,000 mg by mouth once daily., Disp: , Rfl:     azelastine (ASTELIN) 137 mcg (0.1 %) nasal spray, 1 spray by Nasal route once daily., Disp: , Rfl:     fexofenadine (ALLEGRA) 180 MG tablet, Take 180 mg by mouth once daily., Disp: , Rfl:     fluticasone propionate (FLONASE) 50 mcg/actuation nasal spray, 1 spray by Each Nostril route once daily.,  "Disp: , Rfl:     vit C/E/Zn/coppr/lutein/zeaxan (PRESERVISION AREDS-2 ORAL), Take 1 tablet by mouth once daily., Disp: , Rfl:   Meds reviewed and reconciled.  Review of Systems   Respiratory:  Negative for shortness of breath.    Cardiovascular:  Positive for chest pain. Negative for palpitations, orthopnea, claudication, leg swelling and PND.   Neurological:  Negative for dizziness, loss of consciousness and weakness.         Objective:   /72 (BP Location: Left arm, Patient Position: Sitting)   Pulse 95   Ht 5' 11" (1.803 m)   Wt 86.6 kg (191 lb)   SpO2 96%   BMI 26.64 kg/m²     Physical Exam  Vitals and nursing note reviewed.   Constitutional:       Appearance: Normal appearance. He is obese.   HENT:      Head: Normocephalic and atraumatic.   Neck:      Vascular: No carotid bruit.   Cardiovascular:      Rate and Rhythm: Normal rate and regular rhythm.      Pulses: Normal pulses.      Heart sounds: Normal heart sounds.   Pulmonary:      Effort: Pulmonary effort is normal.      Breath sounds: Normal breath sounds.   Abdominal:      Palpations: Abdomen is soft.   Musculoskeletal:      Cervical back: Neck supple.      Right lower leg: No edema.      Left lower leg: No edema.   Skin:     General: Skin is warm and dry.      Capillary Refill: Capillary refill takes less than 2 seconds.   Neurological:      Mental Status: He is alert.         Assessment:     1. Coronary artery disease, unspecified vessel or lesion type, unspecified whether angina present, unspecified whether native or transplanted heart  EKG 12-lead    Nuclear Stress Test    EKG 12-lead      2. Atherosclerosis of native coronary artery of native heart without angina pectoris  NM Myocardial Perfusion Spect Multi Pharmacologic      3. Chest pain, unspecified type  NM Myocardial Perfusion Spect Multi Pharmacologic    Nuclear Stress Test      4. Essential hypertension, benign        5. Hypercholesteremia        6. VERA (dyspnea on exertion)      "         Plan:   Chest pain  Episode of chest pressure 3 weeks ago described as a pressure/throbbing sensation.  This episode occurred while sitting and was relieved in a few seconds.    Schedule Lexiscan    Coronary artery disease  5/3/2021   SUAD prox LADXience Meme 3.5 mm x 12 mm  SUAD prox LAD to mid LAD- Xience Zunilda 3.25 x 23 mm      Essential hypertension, benign  108/72 mmHg    Hypercholesteremia  Lipid panel drawn internal medicine clinic 03/15/2023 reviewed  Total cholesterol 140   Triglycerides 99  HDL 40   LDL 80.2  Crestor 20 mg p.o. daily  Lipids followed by PCP    VERA (dyspnea on exertion)  Pulmonary function test 04/15/2021  Normal pulmonary function tests    Return to clinic in 6 months

## 2023-07-21 NOTE — ASSESSMENT & PLAN NOTE
Lipid panel drawn internal medicine clinic 03/15/2023 reviewed  Total cholesterol 140   Triglycerides 99  HDL 40   LDL 80.2  Crestor 20 mg p.o. daily  Lipids followed by PCP

## 2023-07-21 NOTE — ASSESSMENT & PLAN NOTE
Episode of chest pressure 3 weeks ago described as a pressure/throbbing sensation.  This episode occurred while sitting and was relieved in a few seconds.    Schedule Lexiscan

## 2023-08-08 ENCOUNTER — HOSPITAL ENCOUNTER (OUTPATIENT)
Dept: RADIOLOGY | Facility: HOSPITAL | Age: 73
Discharge: HOME | End: 2023-08-08
Attending: NURSE PRACTITIONER
Payer: OTHER GOVERNMENT

## 2023-08-08 ENCOUNTER — HOSPITAL ENCOUNTER (OUTPATIENT)
Dept: CARDIOLOGY | Facility: HOSPITAL | Age: 73
Discharge: HOME | End: 2023-08-08
Attending: NURSE PRACTITIONER
Payer: OTHER GOVERNMENT

## 2023-08-08 DIAGNOSIS — I25.10 ATHEROSCLEROSIS OF NATIVE CORONARY ARTERY OF NATIVE HEART WITHOUT ANGINA PECTORIS: ICD-10-CM

## 2023-08-08 DIAGNOSIS — R07.9 CHEST PAIN, UNSPECIFIED TYPE: ICD-10-CM

## 2023-08-08 DIAGNOSIS — I25.10 CORONARY ARTERY DISEASE, UNSPECIFIED VESSEL OR LESION TYPE, UNSPECIFIED WHETHER ANGINA PRESENT, UNSPECIFIED WHETHER NATIVE OR TRANSPLANTED HEART: ICD-10-CM

## 2023-08-08 PROCEDURE — 78452 HT MUSCLE IMAGE SPECT MULT: CPT | Mod: TC

## 2023-08-08 PROCEDURE — 93016 NUCLEAR STRESS TEST (CUPID ONLY): ICD-10-PCS | Mod: ,,, | Performed by: NURSE PRACTITIONER

## 2023-08-08 PROCEDURE — 93016 CV STRESS TEST SUPVJ ONLY: CPT | Mod: ,,, | Performed by: NURSE PRACTITIONER

## 2023-08-08 PROCEDURE — 63600175 PHARM REV CODE 636 W HCPCS: Performed by: NURSE PRACTITIONER

## 2023-08-08 PROCEDURE — 78452 NM MYOCARDIAL PERFUSION SPECT MULTI PHARM: ICD-10-PCS | Mod: 26,,, | Performed by: INTERNAL MEDICINE

## 2023-08-08 PROCEDURE — 78452 HT MUSCLE IMAGE SPECT MULT: CPT | Mod: 26,,, | Performed by: INTERNAL MEDICINE

## 2023-08-08 PROCEDURE — A9500 TC99M SESTAMIBI: HCPCS

## 2023-08-08 PROCEDURE — 93018 NUCLEAR STRESS TEST (CUPID ONLY): ICD-10-PCS | Mod: ,,, | Performed by: INTERNAL MEDICINE

## 2023-08-08 PROCEDURE — 93017 CV STRESS TEST TRACING ONLY: CPT

## 2023-08-08 PROCEDURE — 93018 CV STRESS TEST I&R ONLY: CPT | Mod: ,,, | Performed by: INTERNAL MEDICINE

## 2023-08-08 RX ORDER — REGADENOSON 0.08 MG/ML
0.4 INJECTION, SOLUTION INTRAVENOUS ONCE
Status: COMPLETED | OUTPATIENT
Start: 2023-08-08 | End: 2023-08-08

## 2023-08-08 RX ADMIN — REGADENOSON 0.4 MG: 0.08 INJECTION, SOLUTION INTRAVENOUS at 09:08

## 2023-08-09 NOTE — PROGRESS NOTES
Pt notified and verbalized understanding. Pt appt made 8/24 @ 0800a. Pt will come to ER if needed before then. Pt is going to call [facility] for [facility] authorization.

## 2023-08-11 ENCOUNTER — OFFICE VISIT (OUTPATIENT)
Dept: CARDIOLOGY | Facility: CLINIC | Age: 73
End: 2023-08-11
Payer: OTHER GOVERNMENT

## 2023-08-11 VITALS
DIASTOLIC BLOOD PRESSURE: 76 MMHG | HEIGHT: 71 IN | HEART RATE: 67 BPM | SYSTOLIC BLOOD PRESSURE: 118 MMHG | WEIGHT: 193 LBS | BODY MASS INDEX: 27.02 KG/M2 | OXYGEN SATURATION: 97 %

## 2023-08-11 DIAGNOSIS — I10 ESSENTIAL HYPERTENSION, BENIGN: Chronic | ICD-10-CM

## 2023-08-11 DIAGNOSIS — E78.00 HYPERCHOLESTEREMIA: Chronic | ICD-10-CM

## 2023-08-11 DIAGNOSIS — I25.10 CORONARY ARTERY DISEASE INVOLVING NATIVE CORONARY ARTERY OF NATIVE HEART WITHOUT ANGINA PECTORIS: Primary | Chronic | ICD-10-CM

## 2023-08-11 PROCEDURE — 99214 OFFICE O/P EST MOD 30 MIN: CPT | Mod: PBBFAC | Performed by: INTERNAL MEDICINE

## 2023-08-11 PROCEDURE — 99214 OFFICE O/P EST MOD 30 MIN: CPT | Mod: S$PBB,,, | Performed by: INTERNAL MEDICINE

## 2023-08-11 PROCEDURE — 99214 PR OFFICE/OUTPT VISIT, EST, LEVL IV, 30-39 MIN: ICD-10-PCS | Mod: S$PBB,,, | Performed by: INTERNAL MEDICINE

## 2023-08-11 RX ORDER — NITROGLYCERIN 0.4 MG/1
0.4 TABLET SUBLINGUAL EVERY 5 MIN PRN
Qty: 25 TABLET | Refills: 3 | Status: SHIPPED | OUTPATIENT
Start: 2023-08-11 | End: 2024-08-10

## 2023-08-15 LAB
CV STRESS BASE HR: 70 BPM
DIASTOLIC BLOOD PRESSURE: 94 MMHG
OHS CV CPX 1 MINUTE RECOVERY HEART RATE: 94 BPM
OHS CV CPX 85 PERCENT MAX PREDICTED HEART RATE MALE: 124
OHS CV CPX MAX PREDICTED HEART RATE: 146
OHS CV CPX PATIENT IS FEMALE: 0
OHS CV CPX PATIENT IS MALE: 1
OHS CV CPX PEAK DIASTOLIC BLOOD PRESSURE: 78 MMHG
OHS CV CPX PEAK HEAR RATE: 101 BPM
OHS CV CPX PEAK RATE PRESSURE PRODUCT: NORMAL
OHS CV CPX PEAK SYSTOLIC BLOOD PRESSURE: 137 MMHG
OHS CV CPX PERCENT MAX PREDICTED HEART RATE ACHIEVED: 69
OHS CV CPX RATE PRESSURE PRODUCT PRESENTING: NORMAL
SYSTOLIC BLOOD PRESSURE: 158 MMHG

## 2023-08-21 NOTE — PROGRESS NOTES
PCP: Radha Handley NP    Referring Provider:     Subjective:   Agustin Hahn is a 72 y.o. male with hx of CAD status post SUAD proximal to mid LAD 05/03/2021; hyperlipidemia; hypertension; and ASHLY, who presents for test results.     7/19/23--Agustin Hahn is a 72 y.o. male with hx of CAD status post SUAD proximal to mid LAD 05/03/2021; hyperlipidemia; hypertension; and ASHLY, who presents for routine follow-up.  Patient reports having an episode of chest discomfort approximately 3 weeks ago while sitting.  He described this discomfort as a pressure/throbbing sensation lasting a few seconds and resolved with rest.        Fhx:  Family History   Problem Relation Age of Onset    Lung cancer Father     Heart murmur Sister     Stroke Brother      Shx:   Social History     Socioeconomic History    Marital status:    Tobacco Use    Smoking status: Never    Smokeless tobacco: Never   Substance and Sexual Activity    Alcohol use: Not Currently     Comment: beer/whiskey 1-2 qd. Been a month since drinking.    Drug use: Never       EKG   07/19/2023 regular sinus rhythm with heart rate 83 beats per minute, normal EKG  01/19/2023 regular sinus rhythm, heart rate 83 beats per minute, normal EKG    24 hour Holter monitor 4/15/2021  1. Basic rhythm is sinus, average heart rate 88 beats per minute, minimum heart rate 55 beats per minute, maximum heart rate 158 beats per minute  2. Forty-three PACs  3. Forty-one PVCs   4. No diary, no reported symptoms.        Echo Color Flow Doppler? Yes    4/15/21--Interpretation Summary  · The left ventricle is normal in size with normal systolic function.  · The estimated ejection fraction is 60%.  · Normal left ventricular diastolic function.  · Normal right ventricular size with normal right ventricular systolic function.  · Mild tricuspid regurgitation.  · Rhythm is sinus, rate 85 bpm.    LEXISCAN:  8/8/23--  1. Subtle moderate size anterior wall segment of diminished perfusion  near the base improves at rest and is consistent with an area of ischemia.  2. Small fixed apical perfusion defect consistent with artifact or scar but no evidence of ischemia.  3. Normal left ventricular size and systolic function, ejection fraction 65%.     [facility] Results for orders placed during the hospital encounter of 05/03/21    Cardiac catheterization    Conclusion  · The left ventricular systolic function was normal.  · The left ventricular end diastolic pressure was normal.  · The pre-procedure left ventricular end diastolic pressure was 18.  · The ejection fraction was calculated to be 60%.  · The left ventricular ejection fraction was grossly normal.  · There was no mitral valve regurgitation.  · The Prox LAD to Mid LAD lesion was 75% stenosed with 0% stenosis post-intervention.  · A stent was not successfully placed.  · A stent was successfully placed at 12 JL for 8 sec.  · A stent was not successfully placed.  · A stent was successfully placed at 16 JL for 10 sec.  · The Prox LAD lesion was 90% stenosed with 0% stenosis post-intervention.  · A stent was successfully placed at 12 JL for 10 sec.  · The estimated blood loss was none.  · There was single vessel coronary artery disease.    The procedure log was documented by Documenter: RT Goyo and verified by Henry Acosta MD.    Date: 5/3/2021  Time: 5:02 PM    Findings:    1. Severe single-vessel coronary artery disease of the proximal to mid LAD.    2. Normal left ventricular size and systolic function, ejection fraction 60%.    3. No significant mitral regurgitation    4. IFR assessment of proximal to mid LAD lesion giving of IFR of 0.82.    5. Successful PTCA and stent of the mid to proximal LAD using a 3.0 x 38 mm Xience stent overlapped proximally by a 3.25 by 23 mm Xience stent overlapped proximally by a 3.5 x 12 mm Xience stent.    Plan:    1. Aspirin and Brilinta daily    2. Of his on tele overnight.    3. Risk factor  modification.        Lab Results   Component Value Date     04/15/2021    K 4.5 04/15/2021     04/15/2021    CO2 29 04/15/2021    BUN 12 04/15/2021    CREATININE 1.240 04/15/2021    CALCIUM 9.2 04/15/2021    ANIONGAP 9.5 04/15/2021    ESTGFRAFRICA 74 04/15/2021    EGFRNONAA 61 04/15/2021       Lab Results   Component Value Date    CHOL 127 05/03/2021    CHOL 155 04/15/2021     Lab Results   Component Value Date    HDL 32 (L) 05/03/2021    HDL 34 04/15/2021     Lab Results   Component Value Date    LDLCALC 71 05/03/2021    LDLCALC 87 04/15/2021     Lab Results   Component Value Date    TRIG 122 05/03/2021    TRIG 171 04/15/2021     Lab Results   Component Value Date    CHOLHDL 4.0 05/03/2021       Lab Results   Component Value Date    WBC 4.36 (L) 05/04/2021    HGB 13.1 (L) 05/04/2021    HCT 40.0 05/04/2021    MCV 98.0 (H) 05/04/2021     05/04/2021           Current Outpatient Medications:     ascorbic acid, vitamin C, (VITAMIN C) 1000 MG tablet, Take 1,000 mg by mouth once daily., Disp: , Rfl:     aspirin (ECOTRIN) 81 MG EC tablet, Take 1 tablet (81 mg total) by mouth once daily., Disp: 90 tablet, Rfl: 3    azelastine (ASTELIN) 137 mcg (0.1 %) nasal spray, 1 spray by Nasal route once daily., Disp: , Rfl:     clindamycin (CLEOCIN T) 1 % external solution, Apply topically 2 (two) times daily., Disp: 60 mL, Rfl: 11    cyanocobalamin (VITAMIN B-12) 1000 MCG tablet, Take 1,000 mcg by mouth once daily., Disp: , Rfl:     fexofenadine (ALLEGRA) 180 MG tablet, Take 180 mg by mouth once daily., Disp: , Rfl:     fluticasone propionate (FLONASE) 50 mcg/actuation nasal spray, 1 spray by Each Nostril route once daily., Disp: , Rfl:     ipratropium (ATROVENT) 21 mcg (0.03 %) nasal spray, 2 sprays by Each Nostril route every 12 (twelve) hours., Disp: , Rfl:     lisinopriL-hydrochlorothiazide (PRINZIDE,ZESTORETIC) 10-12.5 mg per tablet, Take 1 tablet by mouth once daily., Disp: , Rfl:     nitroGLYCERIN (NITROSTAT)  "0.4 MG SL tablet, Place 1 tablet (0.4 mg total) under the tongue every 5 (five) minutes as needed for Chest pain., Disp: 25 tablet, Rfl: 3    omeprazole (PRILOSEC) 20 MG capsule, Take 20 mg by mouth 2 (two) times daily., Disp: , Rfl:     rosuvastatin (CRESTOR) 20 MG tablet, Take 10 mg by mouth once daily. Take 1/2 po daily. , Disp: , Rfl:     tamsulosin (FLOMAX) 0.4 mg Cap, Take 0.4 mg by mouth 2 (two) times daily., Disp: , Rfl:     triamcinolone acetonide 0.025% (KENALOG) 0.025 % cream, Apply 0.025 % topically once daily., Disp: , Rfl:     vit C/E/Zn/coppr/lutein/zeaxan (PRESERVISION AREDS-2 ORAL), Take 1 tablet by mouth once daily., Disp: , Rfl:     vitamin D (VITAMIN D3) 1000 units Tab, Take 1,000 Units by mouth once daily., Disp: , Rfl:   Meds reviewed and reconciled.    Review of Systems   Respiratory:  Positive for shortness of breath.    Cardiovascular:  Negative for chest pain, palpitations and leg swelling.   Neurological:  Positive for dizziness. Negative for loss of consciousness.           Objective:   /76 (BP Location: Left arm, Patient Position: Sitting)   Pulse 67   Ht 5' 11" (1.803 m)   Wt 87.5 kg (193 lb)   SpO2 97%   BMI 26.92 kg/m²     Physical Exam  Vitals reviewed.   Constitutional:       Appearance: Normal appearance.   HENT:      Head: Normocephalic and atraumatic.   Neck:      Vascular: No carotid bruit or JVD.   Cardiovascular:      Rate and Rhythm: Normal rate and regular rhythm.      Pulses: Normal pulses.           Radial pulses are 2+ on the right side and 2+ on the left side.        Dorsalis pedis pulses are 2+ on the right side and 2+ on the left side.      Heart sounds: Normal heart sounds. No murmur heard.  Pulmonary:      Effort: Pulmonary effort is normal.      Breath sounds: Normal breath sounds.   Musculoskeletal:      Right lower leg: No edema.      Left lower leg: No edema.   Skin:     General: Skin is warm and dry.   Neurological:      Mental Status: He is alert and " oriented to person, place, and time.           Assessment:     1. Coronary artery disease involving native coronary artery of native heart without angina pectoris        2. Hypercholesteremia        3. Essential hypertension, benign              Plan:   NTG 0.4 mg one sl q 5 min prn chest pain  Continue current medications  F/u in 6 months.

## 2024-04-25 ENCOUNTER — OFFICE VISIT (OUTPATIENT)
Dept: DERMATOLOGY | Facility: CLINIC | Age: 74
End: 2024-04-25
Payer: OTHER GOVERNMENT

## 2024-04-25 DIAGNOSIS — L28.1 PRURIGO NODULARIS: ICD-10-CM

## 2024-04-25 DIAGNOSIS — L82.1 SEBORRHEIC KERATOSES: ICD-10-CM

## 2024-04-25 DIAGNOSIS — D22.9 MULTIPLE BENIGN NEVI: ICD-10-CM

## 2024-04-25 DIAGNOSIS — L57.0 ACTINIC KERATOSES: Primary | ICD-10-CM

## 2024-04-25 PROCEDURE — 17000 DESTRUCT PREMALG LESION: CPT | Mod: ,,, | Performed by: STUDENT IN AN ORGANIZED HEALTH CARE EDUCATION/TRAINING PROGRAM

## 2024-04-25 PROCEDURE — 99213 OFFICE O/P EST LOW 20 MIN: CPT | Mod: 25,,, | Performed by: STUDENT IN AN ORGANIZED HEALTH CARE EDUCATION/TRAINING PROGRAM

## 2024-04-25 PROCEDURE — 17003 DESTRUCT PREMALG LES 2-14: CPT | Mod: ,,, | Performed by: STUDENT IN AN ORGANIZED HEALTH CARE EDUCATION/TRAINING PROGRAM

## 2024-04-25 NOTE — PROGRESS NOTES
[facility]  Silviano Rios MD    [address] [place] [address]  [phone]    Fax: [phone]    Patient Name: Agustin Hahn  Medical Record Number: [id number]  PCP: Radha Handley NP  Age: 73 y.o. : 1950  Contact: [phone] (home)     History of Present Illness:     Agustin Hahn is a 73 y.o.  male here for follow up of Aks treated with LN2 at last visit that has improved. Patient is concerned with lesions on the back.     The patient has no other concerns today.    Review of Systems:     Unremarkable other than mentioned above.     Physical Exam:     General: Relaxed, oriented, alert    Skin examination of the scalp, face, neck, chest, back, abdomen, upper extremities and lower extremities were normal except for as listed below      Assessment and Plan:     1. Prurigo Nodularis  - erythematous nodules with central erosions located on the posterior neck    Plan:   Monitor     Counseling  Skin care: Recommend trimming nails short, anti-itch lotions such as Sarna, topical steroids and antihistamines.  Expectations: Prurigo Nodularis is a self-inflicted lesion that results from picking or rubbing the same spot of skin over and over again. If the itch-scratch cycle is broken, the lesions will resolve.      2. Seborrheic keratoses   - brown stuck on appearing papules/plaques  - patient educated on benign nature. No treatment necessary unless they become irritated or inflamed     3. Actinic Keratoses  Erythematous, scaly papules on R forehead, L arm    Plan: Liquid Nitrogen.  A total of 4 lesions were treated with liquid nitrogen for 2 freeze-thaw cycles lasting 5 seconds, located on the above locations.   The patient's consent was obtained including but not limited to risks of crusting, scabbing,  blistering, scarring, darker or lighter pigmentary change, recurrence, incomplete removal and infection.    Counseling.  Sun protective clothing and broad  spectrum sunscreen can prevent the formation of AK.   AKs can be treated with cryotherapy, photodynamic therapy, imiquimod, topical 5-FU.  Actinic Keratoses are precancerous proliferations that occur within sun damaged skin. If untreated,  a small subset of AKs can develop into Squamous Cell Carcinoma.    4. Benign appearing melanocytic nevi   - no lesions appear clinically or dermatoscopically atypical enough to warrant biopsy at this time  - The patient was counseled on the ABCDE's of melanoma and on the importance of performing monthly self skin checks at home in between visits and will call our clinic with changes.  - discussed importance of sunscreen SPF 30 or greater       Return to clinic in 1 year.     AVS printed with patient instructions     Silviano Rios MD   Mohs Surgery/Dermatologic Oncology  Dermatology

## 2024-05-31 DIAGNOSIS — I25.10 ATHEROSCLEROTIC HEART DISEASE OF NATIVE CORONARY ARTERY WITHOUT ANGINA PECTORIS: Primary | ICD-10-CM

## 2024-06-24 ENCOUNTER — OFFICE VISIT (OUTPATIENT)
Dept: CARDIOLOGY | Facility: CLINIC | Age: 74
End: 2024-06-24
Payer: MEDICARE

## 2024-06-24 VITALS
HEART RATE: 72 BPM | DIASTOLIC BLOOD PRESSURE: 72 MMHG | SYSTOLIC BLOOD PRESSURE: 138 MMHG | BODY MASS INDEX: 26.93 KG/M2 | WEIGHT: 192.38 LBS | HEIGHT: 71 IN | OXYGEN SATURATION: 96 %

## 2024-06-24 DIAGNOSIS — I25.10 ATHEROSCLEROTIC HEART DISEASE OF NATIVE CORONARY ARTERY WITHOUT ANGINA PECTORIS: ICD-10-CM

## 2024-06-24 DIAGNOSIS — I10 ESSENTIAL HYPERTENSION, BENIGN: Chronic | ICD-10-CM

## 2024-06-24 DIAGNOSIS — I25.10 CORONARY ARTERY DISEASE, UNSPECIFIED VESSEL OR LESION TYPE, UNSPECIFIED WHETHER ANGINA PRESENT, UNSPECIFIED WHETHER NATIVE OR TRANSPLANTED HEART: Primary | Chronic | ICD-10-CM

## 2024-06-24 DIAGNOSIS — G47.33 OSA (OBSTRUCTIVE SLEEP APNEA): Chronic | ICD-10-CM

## 2024-06-24 DIAGNOSIS — E78.00 HYPERCHOLESTEREMIA: Chronic | ICD-10-CM

## 2024-06-24 LAB
OHS QRS DURATION: 98 MS
OHS QTC CALCULATION: 398 MS

## 2024-06-24 PROCEDURE — 3288F FALL RISK ASSESSMENT DOCD: CPT | Mod: CPTII,,, | Performed by: INTERNAL MEDICINE

## 2024-06-24 PROCEDURE — 93010 ELECTROCARDIOGRAM REPORT: CPT | Mod: S$PBB,,, | Performed by: INTERNAL MEDICINE

## 2024-06-24 PROCEDURE — 99214 OFFICE O/P EST MOD 30 MIN: CPT | Mod: S$PBB,,, | Performed by: INTERNAL MEDICINE

## 2024-06-24 PROCEDURE — 99214 OFFICE O/P EST MOD 30 MIN: CPT | Mod: PBBFAC,25 | Performed by: INTERNAL MEDICINE

## 2024-06-24 PROCEDURE — 3075F SYST BP GE 130 - 139MM HG: CPT | Mod: CPTII,,, | Performed by: INTERNAL MEDICINE

## 2024-06-24 PROCEDURE — 1160F RVW MEDS BY RX/DR IN RCRD: CPT | Mod: CPTII,,, | Performed by: INTERNAL MEDICINE

## 2024-06-24 PROCEDURE — 93005 ELECTROCARDIOGRAM TRACING: CPT | Mod: PBBFAC | Performed by: INTERNAL MEDICINE

## 2024-06-24 PROCEDURE — 99999 PR PBB SHADOW E&M-EST. PATIENT-LVL IV: CPT | Mod: PBBFAC,,, | Performed by: INTERNAL MEDICINE

## 2024-06-24 PROCEDURE — 3078F DIAST BP <80 MM HG: CPT | Mod: CPTII,,, | Performed by: INTERNAL MEDICINE

## 2024-06-24 PROCEDURE — 1159F MED LIST DOCD IN RCRD: CPT | Mod: CPTII,,, | Performed by: INTERNAL MEDICINE

## 2024-06-24 PROCEDURE — 1101F PT FALLS ASSESS-DOCD LE1/YR: CPT | Mod: CPTII,,, | Performed by: INTERNAL MEDICINE

## 2024-07-02 NOTE — PROGRESS NOTES
PCP: Radha Handley NP    Referring Provider:     Subjective:   Agustin Hahn is a 72 y.o. male with hx of CAD status post SUAD proximal to mid LAD 05/03/2021; hyperlipidemia; hypertension; and ASHLY, who presents for follow up.     8/11/23--Agustin Hanh is a 72 y.o. male with hx of CAD status post SUAD proximal to mid LAD 05/03/2021; hyperlipidemia; hypertension; and ASHLY, who presents for test results.     7/19/23--Agustin Hahn is a 73 y.o. male with hx of CAD status post SUAD proximal to mid LAD 05/03/2021; hyperlipidemia; hypertension; and ASHLY, who presents for routine follow-up.  Patient reports having an episode of chest discomfort approximately 3 weeks ago while sitting.  He described this discomfort as a pressure/throbbing sensation lasting a few seconds and resolved with rest.        Fhx:  Family History   Problem Relation Name Age of Onset    Lung cancer Father      Heart murmur Sister      Stroke Brother       Shx:   Social History     Socioeconomic History    Marital status:    Tobacco Use    Smoking status: Never    Smokeless tobacco: Never   Substance and Sexual Activity    Alcohol use: Not Currently     Comment: beer/whiskey 1-2 qd. Been a month since drinking.    Drug use: Never       EKG    6/24/24--NSR, incomplete RBBB, 64 bpm    07/19/2023 regular sinus rhythm with heart rate 83 beats per minute, normal EKG  01/19/2023 regular sinus rhythm, heart rate 83 beats per minute, normal EKG    24 hour Holter monitor 4/15/2021  1. Basic rhythm is sinus, average heart rate 88 beats per minute, minimum heart rate 55 beats per minute, maximum heart rate 158 beats per minute  2. Forty-three PACs  3. Forty-one PVCs   4. No diary, no reported symptoms.        Echo Color Flow Doppler? Yes    4/15/21--Interpretation Summary  · The left ventricle is normal in size with normal systolic function.  · The estimated ejection fraction is 60%.  · Normal left ventricular diastolic function.  · Normal right  ventricular size with normal right ventricular systolic function.  · Mild tricuspid regurgitation.  · Rhythm is sinus, rate 85 bpm.    LEXISCAN:  8/8/23--  1. Subtle moderate size anterior wall segment of diminished perfusion near the base improves at rest and is consistent with an area of ischemia.  2. Small fixed apical perfusion defect consistent with artifact or scar but no evidence of ischemia.  3. Normal left ventricular size and systolic function, ejection fraction 65%.     [facility] Results for orders placed during the hospital encounter of 05/03/21    Cardiac catheterization    Conclusion  · The left ventricular systolic function was normal.  · The left ventricular end diastolic pressure was normal.  · The pre-procedure left ventricular end diastolic pressure was 18.  · The ejection fraction was calculated to be 60%.  · The left ventricular ejection fraction was grossly normal.  · There was no mitral valve regurgitation.  · The Prox LAD to Mid LAD lesion was 75% stenosed with 0% stenosis post-intervention.  · A stent was not successfully placed.  · A stent was successfully placed at 12 JL for 8 sec.  · A stent was not successfully placed.  · A stent was successfully placed at 16 JL for 10 sec.  · The Prox LAD lesion was 90% stenosed with 0% stenosis post-intervention.  · A stent was successfully placed at 12 JL for 10 sec.  · The estimated blood loss was none.  · There was single vessel coronary artery disease.    The procedure log was documented by Documenter: RT Goyo and verified by Henry Acosta MD.    Date: 5/3/2021  Time: 5:02 PM    Findings:    1. Severe single-vessel coronary artery disease of the proximal to mid LAD.    2. Normal left ventricular size and systolic function, ejection fraction 60%.    3. No significant mitral regurgitation    4. IFR assessment of proximal to mid LAD lesion giving of IFR of 0.82.    5. Successful PTCA and stent of the mid to proximal LAD using a 3.0  x 38 mm Xience stent overlapped proximally by a 3.25 by 23 mm Xience stent overlapped proximally by a 3.5 x 12 mm Xience stent.    Plan:    1. Aspirin and Brilinta daily    2. Of his on tele overnight.    3. Risk factor modification.        Lab Results   Component Value Date     04/15/2021    K 4.5 04/15/2021     04/15/2021    CO2 29 04/15/2021    BUN 12 04/15/2021    CREATININE 1.240 04/15/2021    CALCIUM 9.2 04/15/2021    ANIONGAP 9.5 04/15/2021    ESTGFRAFRICA 74 04/15/2021    EGFRNONAA 61 04/15/2021       Lab Results   Component Value Date    CHOL 127 05/03/2021    CHOL 155 04/15/2021     Lab Results   Component Value Date    HDL 32 (L) 05/03/2021    HDL 34 04/15/2021     Lab Results   Component Value Date    LDLCALC 71 05/03/2021    LDLCALC 87 04/15/2021     Lab Results   Component Value Date    TRIG 122 05/03/2021    TRIG 171 04/15/2021     Lab Results   Component Value Date    CHOLHDL 4.0 05/03/2021       Lab Results   Component Value Date    WBC 4.36 (L) 05/04/2021    HGB 13.1 (L) 05/04/2021    HCT 40.0 05/04/2021    MCV 98.0 (H) 05/04/2021     05/04/2021           Current Outpatient Medications:     ascorbic acid, vitamin C, (VITAMIN C) 1000 MG tablet, Take 1,000 mg by mouth once daily., Disp: , Rfl:     aspirin (ECOTRIN) 81 MG EC tablet, Take 1 tablet (81 mg total) by mouth once daily., Disp: 90 tablet, Rfl: 3    azelastine (ASTELIN) 137 mcg (0.1 %) nasal spray, 1 spray by Nasal route once daily., Disp: , Rfl:     cyanocobalamin (VITAMIN B-12) 1000 MCG tablet, Take 1,000 mcg by mouth once daily., Disp: , Rfl:     fexofenadine (ALLEGRA) 180 MG tablet, Take 180 mg by mouth once daily., Disp: , Rfl:     fluticasone propionate (FLONASE) 50 mcg/actuation nasal spray, 1 spray by Each Nostril route once daily., Disp: , Rfl:     ipratropium (ATROVENT) 21 mcg (0.03 %) nasal spray, 2 sprays by Each Nostril route every 12 (twelve) hours., Disp: , Rfl:     lisinopriL-hydrochlorothiazide  "(PRINZIDE,ZESTORETIC) 10-12.5 mg per tablet, Take 1 tablet by mouth once daily., Disp: , Rfl:     nitroGLYCERIN (NITROSTAT) 0.4 MG SL tablet, Place 1 tablet (0.4 mg total) under the tongue every 5 (five) minutes as needed for Chest pain., Disp: 25 tablet, Rfl: 3    omeprazole (PRILOSEC) 20 MG capsule, Take 20 mg by mouth 2 (two) times daily., Disp: , Rfl:     rosuvastatin (CRESTOR) 20 MG tablet, Take 10 mg by mouth once daily. Take 1/2 po daily. , Disp: , Rfl:     tamsulosin (FLOMAX) 0.4 mg Cap, Take 0.4 mg by mouth 2 (two) times daily., Disp: , Rfl:     triamcinolone acetonide 0.025% (KENALOG) 0.025 % cream, Apply 0.025 % topically once daily., Disp: , Rfl:     vit C/E/Zn/coppr/lutein/zeaxan (PRESERVISION AREDS-2 ORAL), Take 1 tablet by mouth once daily., Disp: , Rfl:     vitamin D (VITAMIN D3) 1000 units Tab, Take 1,000 Units by mouth once daily., Disp: , Rfl:   Meds reviewed and reconciled.    Review of Systems   Respiratory:  Positive for shortness of breath.    Cardiovascular:  Negative for chest pain, palpitations and leg swelling.   Neurological:  Negative for loss of consciousness.           Objective:   /72 (BP Location: Left arm, Patient Position: Sitting)   Pulse 72   Ht 5' 11" (1.803 m)   Wt 87.3 kg (192 lb 6.4 oz)   SpO2 96%   BMI 26.83 kg/m²     Physical Exam  Vitals reviewed.   Constitutional:       General: He is not in acute distress.     Appearance: Normal appearance.   HENT:      Head: Normocephalic and atraumatic.   Neck:      Vascular: No carotid bruit or JVD.   Cardiovascular:      Rate and Rhythm: Normal rate and regular rhythm.      Pulses: Normal pulses.      Heart sounds: Normal heart sounds. No murmur heard.  Pulmonary:      Effort: Pulmonary effort is normal.      Breath sounds: Normal breath sounds.   Musculoskeletal:      Right lower leg: No edema.      Left lower leg: No edema.   Skin:     General: Skin is warm and dry.   Neurological:      Mental Status: He is alert. "           Assessment:     1. Coronary artery disease, unspecified vessel or lesion type, unspecified whether angina present, unspecified whether native or transplanted heart  EKG 12-lead    EKG 12-lead    s/p stent mLAD      2. Hypercholesteremia        3. Essential hypertension, benign        4. ASHLY (obstructive sleep apnea)        5. Atherosclerotic heart disease of native coronary artery without angina pectoris  Ambulatory referral/consult to Cardiology            Plan:   Continue current medications  F/u in 6 months.

## 2024-12-28 ENCOUNTER — HOSPITAL ENCOUNTER (EMERGENCY)
Facility: HOSPITAL | Age: 74
Discharge: HOME | End: 2024-12-28
Attending: EMERGENCY MEDICINE
Payer: OTHER GOVERNMENT

## 2024-12-28 VITALS
WEIGHT: 184 LBS | TEMPERATURE: 98 F | SYSTOLIC BLOOD PRESSURE: 120 MMHG | HEART RATE: 73 BPM | OXYGEN SATURATION: 97 % | HEIGHT: 71 IN | DIASTOLIC BLOOD PRESSURE: 74 MMHG | BODY MASS INDEX: 25.76 KG/M2 | RESPIRATION RATE: 18 BRPM

## 2024-12-28 DIAGNOSIS — R42 VERTIGO: Primary | ICD-10-CM

## 2024-12-28 DIAGNOSIS — R42 DIZZINESS: ICD-10-CM

## 2024-12-28 PROCEDURE — 93010 ELECTROCARDIOGRAM REPORT: CPT | Mod: ,,, | Performed by: HOSPITALIST

## 2024-12-28 PROCEDURE — 99283 EMERGENCY DEPT VISIT LOW MDM: CPT | Mod: 25

## 2024-12-28 PROCEDURE — 93005 ELECTROCARDIOGRAM TRACING: CPT

## 2024-12-28 NOTE — ED PROVIDER NOTES
Encounter Date: 12/28/2024    SCRIBE #1 NOTE: I, Juni Green, am scribing for, and in the presence of,  Franklin Torres MD. I have scribed the entire note.       History     Chief Complaint   Patient presents with    Dizziness     Pt presents to ED via POV with c/o dizziness yesterday when sitting at table at [facility]. Pt reports calling Dr. Acosta and reports Dr. Acosta told him to come in to get checked out.     74 y.o.male presented to the ED for dizziness that happened yesterday. He was sitting down to eat and noticed the walls, and tables looked as if they were in motion while he was sitting still. He stated that it last for a minute and other that that he hasn't had any other episodes. He denies headache, nausea, vomiting, chest pains, and sob. He has 2 stents placed 5 or 6 years ago due to being sob at the time. Pt has no hx of smoking and has past medical hx of Coronary artery disease, Hyperlipidemia, Hypertension, and ASHLY.             The history is provided by the patient. No  was used.     Review of patient's allergies indicates:  No Known Allergies  Past Medical History:   Diagnosis Date    Coronary artery disease     Hyperlipidemia     Hypertension     ASHLY (obstructive sleep apnea)      Past Surgical History:   Procedure Laterality Date    ANGIOGRAM, CORONARY, WITH LEFT HEART CATHETERIZATION Right 5/3/2021    Procedure: Angiogram, Coronary, with Left Heart Cath;  Surgeon: Henry Acosta MD;  Location: [facility] CATH LAB;  Service: Cardiology;  Laterality: Right;    FEMUR SURGERY Left     HERNIA REPAIR      x4    SHOULDER SURGERY      Right rotator cuff    TOE SURGERY      Left fifth toe     Family History   Problem Relation Name Age of Onset    Lung cancer Father      Heart murmur Sister      Stroke Brother       Social History     Tobacco Use    Smoking status: Never    Smokeless tobacco: Never   Substance Use Topics    Alcohol use: Not Currently     Comment:  beer/whiskey 1-2 qd. Been a month since drinking.    Drug use: Never     Review of Systems   Respiratory:  Negative for shortness of breath.    Cardiovascular:  Negative for chest pain and palpitations.   Gastrointestinal:  Negative for nausea and vomiting.   Neurological:  Positive for dizziness. Negative for headaches.       Physical Exam     Initial Vitals [12/28/24 1338]   BP Pulse Resp Temp SpO2   120/74 73 18 98.2 °F (36.8 °C) 97 %      MAP       --         Physical Exam    Nursing note and vitals reviewed.  Constitutional: He appears well-developed and well-nourished.   HENT:   Head: Normocephalic and atraumatic. Mouth/Throat: Oropharynx is clear and moist.   Eyes: Pupils are equal, round, and reactive to light.   Neck: Neck supple.   Normal range of motion.  Cardiovascular:  Normal rate and regular rhythm.           Pulmonary/Chest: Effort normal and breath sounds normal.   Abdominal: Abdomen is soft. He exhibits no distension.   Musculoskeletal:         General: Normal range of motion.      Cervical back: Normal range of motion and neck supple.     Neurological: He is alert. He has normal strength and normal reflexes. He displays normal reflexes. No cranial nerve deficit or sensory deficit.   Skin: Skin is warm. Capillary refill takes less than 2 seconds.   Psychiatric: He has a normal mood and affect.         ED Course   Procedures  Labs Reviewed - No data to display       Imaging Results    None          Medications - No data to display  Medical Decision Making            Attending Attestation:           Physician Attestation for Scribe:  Physician Attestation Statement for Scribe #1: I, Franklin Torres MD, reviewed documentation, as scribed by Juni Menon in my presence, and it is both accurate and complete.             ED Course as of 12/28/24 1542   Sat Dec 28, 2024   1354 Medical decision-making:  Differential diagnosis includes vertigo, benign positional vertigo, dizziness.  No labs or imaging  were performed on this patient.  Cerebellar function is completely normal including Romberg test. [BB]      ED Course User Index  [BB] Franklin Garcia MD                           Clinical Impression:  Final diagnoses:  [R42] Vertigo (Primary)  [R42] Dizziness          ED Disposition Condition    Discharge Stable          ED Prescriptions    None       Follow-up Information    None          Franklin Garcia MD  12/28/24 1342

## 2024-12-28 NOTE — DISCHARGE INSTRUCTIONS
Continue current medications as prescribed.  Return to emergency department for any worsening or further problems.  Follow up in clinic with primary care provider in 2-3 days for recheck if symptoms persist.

## 2024-12-29 LAB
OHS QRS DURATION: 102 MS
OHS QTC CALCULATION: 414 MS

## 2025-05-30 ENCOUNTER — OFFICE VISIT (OUTPATIENT)
Dept: DERMATOLOGY | Facility: CLINIC | Age: 75
End: 2025-05-30
Payer: OTHER GOVERNMENT

## 2025-05-30 DIAGNOSIS — D22.9 MULTIPLE BENIGN NEVI: ICD-10-CM

## 2025-05-30 DIAGNOSIS — L82.0 INFLAMED SEBORRHEIC KERATOSIS: ICD-10-CM

## 2025-05-30 DIAGNOSIS — L57.0 ACTINIC KERATOSES: Primary | ICD-10-CM

## 2025-05-30 DIAGNOSIS — L82.1 SEBORRHEIC KERATOSES: ICD-10-CM

## 2025-05-30 NOTE — PROGRESS NOTES
[facility]  Silviano Rios MD    [address] [address]  [phone]    Fax: [phone]    Patient Name: Agustin Hahn  Medical Record Number: [id number]  PCP: Radha Handley NP  Age: 74 y.o. : 1950  Contact: There are no phone numbers on file.    History of Present Illness:     Agustin Hahn is a 74 y.o.  male here for follow up of Aks. Patient is concerned with irritated lesions on the temple and L hand.    The patient has no other concerns today.    Review of Systems:     Unremarkable other than mentioned above.     Physical Exam:     General: Relaxed, oriented, alert    Skin examination of the scalp, face, neck, chest, back, abdomen, upper extremities and lower extremities were normal except for as listed below      Assessment and Plan:     1. Irritated Seborrheic Keratoses (L82.0)  Stuck-on inflamed papules with crust located on the L temple  Associated diagnoses: Pruritus and Cutaneous Inflammation    Plan: Liquid Nitrogen.  A total of 1 lesions were treated with liquid nitrogen, located on the above listed location.  This procedure was medically necessary because the lesions that were treated were: irritated and itchy. The  patient's consent was obtained including but not limited to risks of crusting, scabbing, blistering, scarring, darker  or lighter pigmentary change, recurrence, incomplete removal and infection.      2. Actinic Keratoses  Erythematous, scaly papules on L hand, R ear    Plan: Liquid Nitrogen.  A total of 3 lesions were treated with liquid nitrogen for 2 freeze-thaw cycles lasting 5 seconds, located on the above locations.   The patient's consent was obtained including but not limited to risks of crusting, scabbing,  blistering, scarring, darker or lighter pigmentary change, recurrence, incomplete removal and infection.    Counseling.  Sun protective clothing and broad spectrum sunscreen can prevent the formation of  AK.   AKs can be treated with cryotherapy, photodynamic therapy, imiquimod, topical 5-FU.  Actinic Keratoses are precancerous proliferations that occur within sun damaged skin. If untreated,  a small subset of AKs can develop into Squamous Cell Carcinoma.    3. Seborrheic keratoses   - brown stuck on appearing papules/plaques  - patient educated on benign nature. No treatment necessary unless they become irritated or inflamed     4. Benign appearing melanocytic nevi   - no lesions appear clinically or dermatoscopically atypical enough to warrant biopsy at this time  - The patient was counseled on the ABCDE's of melanoma and on the importance of performing monthly self skin checks at home in between visits and will call our clinic with changes.  - discussed importance of sunscreen SPF 30 or greater     Silviano Rios MD   Mohs Surgery/Dermatologic Oncology  Dermatology

## (undated) DEVICE — TUBING CAPNOLINE PLUS SMART 02 CONNECTOR(ORDER 65110)

## (undated) DEVICE — SYRINGE 150CC INJECTABLE POWER

## (undated) DEVICE — SHEATH INTRO AVANTI 6FR 11X038 BX/5

## (undated) DEVICE — GLOVE SURGICAL PROTEXIS PI SIZE 6.5

## (undated) DEVICE — DEVICE INFLATION BLUE DIAMOND IN7112

## (undated) DEVICE — DRESSING IV TEGADERM 10X12CM

## (undated) DEVICE — SEAL ANGIO 6FR VIP - VASCULAR CLOSURE DEVICE BX/10

## (undated) DEVICE — CATH COR GUIDE 6FR XB3.5

## (undated) DEVICE — CATHETER GUIDELINER V3 6FR

## (undated) DEVICE — CATH IMPULSE 6FR MULTIPACK

## (undated) DEVICE — SENSOR PULSE OX ADULT

## (undated) DEVICE — BAG-A-JET FLUID DISPENSER SYSTEM

## (undated) DEVICE — CDS ANGIOGRAPHY PACK

## (undated) DEVICE — APPLICATOR CHLORAPREP LITE ORANGE 10.5ML STERILE

## (undated) DEVICE — KIT Y-ADAPTER W/INTRO NEEDLE

## (undated) DEVICE — TOWEL OR STERILE BLUE 4/PK 20PK/CS

## (undated) DEVICE — SET IV PRIMARY ALARIS (PRIMARY)

## (undated) DEVICE — Device

## (undated) DEVICE — KIT ANGIO MANIFOLD CUSTOM RFH LEFT HEART KIT

## (undated) DEVICE — ISOVUE 370 100ML

## (undated) DEVICE — SET IV EXTENSION 42IN W/2 PORT CLEARLINK

## (undated) DEVICE — GLOVE SURGICAL PROTEXIS PI SIZE 6

## (undated) DEVICE — GLOVE SURG BIOGEL SZ 7.5

## (undated) DEVICE — POSITIONER ULNAR NERVE